# Patient Record
Sex: FEMALE | Race: WHITE | Employment: STUDENT | URBAN - METROPOLITAN AREA
[De-identification: names, ages, dates, MRNs, and addresses within clinical notes are randomized per-mention and may not be internally consistent; named-entity substitution may affect disease eponyms.]

---
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
## 2018-05-31 ENCOUNTER — HOSPITAL ENCOUNTER (EMERGENCY)
Facility: HOSPITAL | Age: 18
Discharge: HOME | End: 2018-05-31
Attending: PEDIATRICS
Payer: COMMERCIAL

## 2018-05-31 ENCOUNTER — APPOINTMENT (OUTPATIENT)
Dept: CT IMAGING | Facility: HOSPITAL | Age: 18
End: 2018-05-31
Attending: PEDIATRICS
Payer: COMMERCIAL

## 2018-05-31 VITALS
TEMPERATURE: 98 F | WEIGHT: 120 LBS | HEART RATE: 77 BPM | RESPIRATION RATE: 16 BRPM | BODY MASS INDEX: 19 KG/M2 | SYSTOLIC BLOOD PRESSURE: 97 MMHG | DIASTOLIC BLOOD PRESSURE: 62 MMHG | OXYGEN SATURATION: 98 %

## 2018-05-31 DIAGNOSIS — G43.109 MIGRAINE WITH AURA AND WITHOUT STATUS MIGRAINOSUS, NOT INTRACTABLE: Primary | ICD-10-CM

## 2018-05-31 PROCEDURE — 96375 TX/PRO/DX INJ NEW DRUG ADDON: CPT

## 2018-05-31 PROCEDURE — 99284 EMERGENCY DEPT VISIT MOD MDM: CPT

## 2018-05-31 PROCEDURE — 70450 CT HEAD/BRAIN W/O DYE: CPT | Performed by: PEDIATRICS

## 2018-05-31 PROCEDURE — 96361 HYDRATE IV INFUSION ADD-ON: CPT

## 2018-05-31 PROCEDURE — 96374 THER/PROPH/DIAG INJ IV PUSH: CPT

## 2018-05-31 PROCEDURE — 81025 URINE PREGNANCY TEST: CPT

## 2018-05-31 RX ORDER — DIPHENHYDRAMINE HYDROCHLORIDE 50 MG/ML
50 INJECTION INTRAMUSCULAR; INTRAVENOUS ONCE
Status: COMPLETED | OUTPATIENT
Start: 2018-05-31 | End: 2018-05-31

## 2018-05-31 RX ORDER — ONDANSETRON 2 MG/ML
4 INJECTION INTRAMUSCULAR; INTRAVENOUS ONCE
Status: COMPLETED | OUTPATIENT
Start: 2018-05-31 | End: 2018-05-31

## 2018-05-31 RX ORDER — ONDANSETRON 4 MG/1
4 TABLET, ORALLY DISINTEGRATING ORAL EVERY 8 HOURS PRN
Qty: 10 TABLET | Refills: 0 | Status: SHIPPED | OUTPATIENT
Start: 2018-05-31 | End: 2018-06-07

## 2018-05-31 RX ORDER — KETOROLAC TROMETHAMINE 30 MG/ML
0.5 INJECTION, SOLUTION INTRAMUSCULAR; INTRAVENOUS ONCE
Status: COMPLETED | OUTPATIENT
Start: 2018-05-31 | End: 2018-05-31

## 2018-05-31 NOTE — ED INITIAL ASSESSMENT (HPI)
Pt with history of migraines. Headache started about 1.5 hours pta. States she had visual aura. c/o numbness and tingling right arm and leg. Deny vomiting. c/o nausea. Deny fever.

## 2018-05-31 NOTE — ED PROVIDER NOTES
Patient Seen in: [facility] Emergency Department    History   Patient presents with:  Headache (neurologic)    Stated Complaint: migraine    HPI    20-year-old female with a history of anxiety, asthma, migraines, ulcerative colitis and primary scler 97/62   Pulse 77   Temp 98.4 °F (36.9 °C) (Temporal)   Resp 16   Wt 54.4 kg   LMP 05/24/2018 (Approximate)   SpO2 98%   BMI 18.79 kg/m²         Physical Exam   PE: Awake, alert, NAD  HEENT: PERRLA; TMS clear; OP clear  COR:  RRR  Chest: clear  Abdomen: sof [address]  ------------------------------------------------------------      MDM   43-year-old female with above past medical history including migraines complaining of migraine headache started this morning.   Patient with a normal neurological exam.  PIV, norm

## 2018-07-06 ENCOUNTER — HOSPITAL ENCOUNTER (OUTPATIENT)
Dept: GENERAL RADIOLOGY | Age: 18
Discharge: HOME | End: 2018-07-06
Attending: INTERNAL MEDICINE
Payer: COMMERCIAL

## 2018-07-06 DIAGNOSIS — R93.3 ABNORMAL VIRTUAL COLONOSCOPE: ICD-10-CM

## 2018-07-06 PROCEDURE — 74018 RADEX ABDOMEN 1 VIEW: CPT | Performed by: INTERNAL MEDICINE

## 2018-11-03 ENCOUNTER — HOSPITAL ENCOUNTER (EMERGENCY)
Facility: HOSPITAL | Age: 18
Discharge: HOME | End: 2018-11-03
Attending: PEDIATRICS
Payer: COMMERCIAL

## 2018-11-03 VITALS
HEART RATE: 88 BPM | SYSTOLIC BLOOD PRESSURE: 111 MMHG | TEMPERATURE: 100 F | RESPIRATION RATE: 18 BRPM | WEIGHT: 108.69 LBS | OXYGEN SATURATION: 100 % | BODY MASS INDEX: 17 KG/M2 | DIASTOLIC BLOOD PRESSURE: 82 MMHG

## 2018-11-03 DIAGNOSIS — G43.119 INTRACTABLE MIGRAINE WITH VISUAL AURA AND WITHOUT STATUS MIGRAINOSUS: Primary | ICD-10-CM

## 2018-11-03 PROCEDURE — 85025 COMPLETE CBC W/AUTO DIFF WBC: CPT | Performed by: PEDIATRICS

## 2018-11-03 PROCEDURE — 80053 COMPREHEN METABOLIC PANEL: CPT | Performed by: PEDIATRICS

## 2018-11-03 PROCEDURE — 99284 EMERGENCY DEPT VISIT MOD MDM: CPT

## 2018-11-03 PROCEDURE — 96375 TX/PRO/DX INJ NEW DRUG ADDON: CPT

## 2018-11-03 PROCEDURE — 96361 HYDRATE IV INFUSION ADD-ON: CPT

## 2018-11-03 PROCEDURE — 96374 THER/PROPH/DIAG INJ IV PUSH: CPT

## 2018-11-03 RX ORDER — DIPHENHYDRAMINE HYDROCHLORIDE 50 MG/ML
50 INJECTION INTRAMUSCULAR; INTRAVENOUS ONCE
Status: COMPLETED | OUTPATIENT
Start: 2018-11-03 | End: 2018-11-03

## 2018-11-03 RX ORDER — ONDANSETRON 4 MG/1
4 TABLET, ORALLY DISINTEGRATING ORAL EVERY 8 HOURS PRN
Qty: 10 TABLET | Refills: 0 | Status: SHIPPED | OUTPATIENT
Start: 2018-11-03 | End: 2018-11-10

## 2018-11-03 RX ORDER — KETOROLAC TROMETHAMINE 30 MG/ML
0.5 INJECTION, SOLUTION INTRAMUSCULAR; INTRAVENOUS ONCE
Status: COMPLETED | OUTPATIENT
Start: 2018-11-03 | End: 2018-11-03

## 2018-11-03 RX ORDER — BUTALBITAL, ASPIRIN, AND CAFFEINE 50; 325; 40 MG/1; MG/1; MG/1
1 CAPSULE ORAL EVERY 4 HOURS PRN
COMMUNITY

## 2018-11-03 RX ORDER — ONDANSETRON 2 MG/ML
2 INJECTION INTRAMUSCULAR; INTRAVENOUS ONCE
Status: DISCONTINUED | OUTPATIENT
Start: 2018-11-03 | End: 2018-11-03

## 2018-11-03 RX ORDER — ONDANSETRON 2 MG/ML
4 INJECTION INTRAMUSCULAR; INTRAVENOUS ONCE
Status: COMPLETED | OUTPATIENT
Start: 2018-11-03 | End: 2018-11-03

## 2018-11-03 RX ORDER — ONDANSETRON 4 MG/1
4 TABLET, ORALLY DISINTEGRATING ORAL EVERY 8 HOURS PRN
COMMUNITY

## 2018-11-03 NOTE — ED INITIAL ASSESSMENT (HPI)
Reports got the flu shot yesterday, and woke up with body aches. Reports body aches were so bad, she took x1 hydrocodone one hour ago because she cannot take nsaids. Reports approx 1 hr ago began feeling blurry vision.  Reports last migraine last week, and

## 2018-11-03 NOTE — ED NOTES
Pt declined additional zofran, pt reports nausea improved. Pt stable, IV d/c with cath intact. Pt reports feeling better.

## 2018-11-03 NOTE — ED PROVIDER NOTES
Patient Seen in: [facility] Emergency Department    History   Patient presents with: Eye Visual Problem (opthalmic)    Stated Complaint: Ocular migraines, more frequent, \"hard time seeing\" per patient.  Does not follo*    HPI    75-year-old female All other systems reviewed and negative except as noted above.     Physical Exam     ED Triage Vitals [11/03/18 1321]   /40   Pulse 118   Resp 24   Temp 100.1 °F (37.8 °C)   Temp src Temporal   SpO2 100 %   O2 Device None (Room air)       Current: ondansetron HCl (ZOFRAN) injection 4 mg (4 mg Intravenous Given 11/3/18 4390)   DiphenhydrAMINE HCl (BENADRYL) injection 50 mg (50 mg Intravenous Given 11/3/18 0445)           MDM   23year-old female with a history of migraines with visual aura teacher

## 2019-02-22 ENCOUNTER — APPOINTMENT (OUTPATIENT)
Dept: CT IMAGING | Age: 19
End: 2019-02-22
Attending: EMERGENCY MEDICINE
Payer: COMMERCIAL

## 2019-02-22 ENCOUNTER — HOSPITAL ENCOUNTER (EMERGENCY)
Age: 19
Discharge: HOME | End: 2019-02-22
Attending: EMERGENCY MEDICINE
Payer: COMMERCIAL

## 2019-02-22 VITALS
OXYGEN SATURATION: 100 % | HEIGHT: 67 IN | DIASTOLIC BLOOD PRESSURE: 74 MMHG | SYSTOLIC BLOOD PRESSURE: 125 MMHG | TEMPERATURE: 99 F | BODY MASS INDEX: 18.05 KG/M2 | WEIGHT: 115 LBS | RESPIRATION RATE: 16 BRPM | HEART RATE: 83 BPM

## 2019-02-22 DIAGNOSIS — S00.03XA CONTUSION OF SCALP, INITIAL ENCOUNTER: Primary | ICD-10-CM

## 2019-02-22 PROCEDURE — 99284 EMERGENCY DEPT VISIT MOD MDM: CPT | Performed by: EMERGENCY MEDICINE

## 2019-02-22 PROCEDURE — 70450 CT HEAD/BRAIN W/O DYE: CPT | Performed by: EMERGENCY MEDICINE

## 2019-02-22 PROCEDURE — 72125 CT NECK SPINE W/O DYE: CPT | Performed by: EMERGENCY MEDICINE

## 2019-02-22 NOTE — ED INITIAL ASSESSMENT (HPI)
Pt c/o head and neck pain after slipping out of her rocking chair yesterday at her dorm room. Pt denies loc. Pt c/o slight nausea and bilateral eye pain.

## 2019-02-22 NOTE — ED PROVIDER NOTES
Patient Seen in: [facility] In [place]    History   Patient presents with:  Head Neck Injury (neurologic, musculoskeletal)    Stated Complaint: head injury, headache    HPI    The patient is a 42-year-old female with a history of migr 16   Ht 170.2 cm (5' 7\")   Wt 52.2 kg   LMP 02/19/2019   SpO2 100%   BMI 18.01 kg/m²         Physical Exam    General: Alert and oriented in no distress. Eyes: EOMI, PERRLA. Neuro: CN II-XII intact.  Grossly normal and symmetric motor strength and sensa base through C7. Multiplanar reconstructions are     generated. Dose reduction techniques were used.  Dose information is     transmitted to the  [facility]     Radiology) Silvestre Brunson [address] ([address]) which includes the Dose     In (25933), 5/31/2018, 13:13. INDICATIONS:  head injury, headache         TECHNIQUE:  Noncontrast CT scanning is performed through the brain. Dose     reduction techniques were used.  Dose information is transmitted to the [facility] of Medication List as of 2/22/2019  7:13 PM

## 2019-03-01 VITALS
WEIGHT: 130 LBS | SYSTOLIC BLOOD PRESSURE: 118 MMHG | BODY MASS INDEX: 20.4 KG/M2 | HEART RATE: 106 BPM | HEIGHT: 67 IN | RESPIRATION RATE: 22 BRPM | DIASTOLIC BLOOD PRESSURE: 80 MMHG

## 2019-03-17 ENCOUNTER — APPOINTMENT (OUTPATIENT)
Dept: GENERAL RADIOLOGY | Facility: HOSPITAL | Age: 19
End: 2019-03-17
Attending: EMERGENCY MEDICINE
Payer: COMMERCIAL

## 2019-03-17 ENCOUNTER — HOSPITAL ENCOUNTER (EMERGENCY)
Facility: HOSPITAL | Age: 19
Discharge: HOME | End: 2019-03-17
Attending: EMERGENCY MEDICINE
Payer: COMMERCIAL

## 2019-03-17 VITALS
HEIGHT: 67 IN | OXYGEN SATURATION: 98 % | BODY MASS INDEX: 18.05 KG/M2 | DIASTOLIC BLOOD PRESSURE: 70 MMHG | SYSTOLIC BLOOD PRESSURE: 112 MMHG | TEMPERATURE: 98 F | HEART RATE: 68 BPM | RESPIRATION RATE: 17 BRPM | WEIGHT: 115 LBS

## 2019-03-17 DIAGNOSIS — R11.0 NAUSEA: Primary | ICD-10-CM

## 2019-03-17 DIAGNOSIS — R10.9 ABDOMINAL PAIN OF UNKNOWN ETIOLOGY: ICD-10-CM

## 2019-03-17 LAB
ALBUMIN SERPL-MCNC: 3.8 G/DL (ref 3.4–5)
ALBUMIN/GLOB SERPL: 1 {RATIO} (ref 1–2)
ALP LIVER SERPL-CCNC: 283 U/L (ref 52–144)
ALT SERPL-CCNC: 159 U/L (ref 13–56)
ANION GAP SERPL CALC-SCNC: 11 MMOL/L (ref 0–18)
AST SERPL-CCNC: 67 U/L (ref 15–37)
BASOPHILS # BLD AUTO: 0.02 X10(3) UL (ref 0–0.2)
BASOPHILS NFR BLD AUTO: 0.3 %
BILIRUB SERPL-MCNC: 0.7 MG/DL (ref 0.1–2)
BILIRUB UR QL STRIP.AUTO: NEGATIVE
BUN BLD-MCNC: 8 MG/DL (ref 7–18)
BUN/CREAT SERPL: 10.8 (ref 10–20)
CALCIUM BLD-MCNC: 9 MG/DL (ref 8.5–10.1)
CHLORIDE SERPL-SCNC: 109 MMOL/L (ref 98–107)
CO2 SERPL-SCNC: 21 MMOL/L (ref 21–32)
COLOR UR AUTO: YELLOW
CREAT BLD-MCNC: 0.74 MG/DL (ref 0.55–1.02)
DEPRECATED RDW RBC AUTO: 38.3 FL (ref 35.1–46.3)
EOSINOPHIL # BLD AUTO: 0.15 X10(3) UL (ref 0–0.7)
EOSINOPHIL NFR BLD AUTO: 2 %
ERYTHROCYTE [DISTWIDTH] IN BLOOD BY AUTOMATED COUNT: 12 % (ref 11–15)
GLOBULIN PLAS-MCNC: 3.7 G/DL (ref 2.8–4.4)
GLUCOSE BLD-MCNC: 80 MG/DL (ref 70–99)
GLUCOSE UR STRIP.AUTO-MCNC: NEGATIVE MG/DL
HCT VFR BLD AUTO: 40.6 % (ref 35–48)
HGB BLD-MCNC: 14.4 G/DL (ref 12–16)
IMM GRANULOCYTES # BLD AUTO: 0.02 X10(3) UL (ref 0–1)
IMM GRANULOCYTES NFR BLD: 0.3 %
KETONES UR STRIP.AUTO-MCNC: 20 MG/DL
LEUKOCYTE ESTERASE UR QL STRIP.AUTO: NEGATIVE
LYMPHOCYTES # BLD AUTO: 2.16 X10(3) UL (ref 1–4)
LYMPHOCYTES NFR BLD AUTO: 28.5 %
M PROTEIN MFR SERPL ELPH: 7.5 G/DL (ref 6.4–8.2)
MCH RBC QN AUTO: 31.2 PG (ref 26–34)
MCHC RBC AUTO-ENTMCNC: 35.5 G/DL (ref 31–37)
MCV RBC AUTO: 88.1 FL (ref 80–100)
MONOCYTES # BLD AUTO: 0.56 X10(3) UL (ref 0.1–1)
MONOCYTES NFR BLD AUTO: 7.4 %
NEUTROPHILS # BLD AUTO: 4.68 X10 (3) UL (ref 1.5–7.7)
NEUTROPHILS # BLD AUTO: 4.68 X10(3) UL (ref 1.5–7.7)
NEUTROPHILS NFR BLD AUTO: 61.5 %
NITRITE UR QL STRIP.AUTO: NEGATIVE
OSMOLALITY SERPL CALC.SUM OF ELEC: 289 MOSM/KG (ref 275–295)
PH UR STRIP.AUTO: 6 [PH] (ref 4.5–8)
PLATELET # BLD AUTO: 258 10(3)UL (ref 150–450)
POCT LOT NUMBER: NORMAL
POCT URINE PREGNANCY: NEGATIVE
POTASSIUM SERPL-SCNC: 3 MMOL/L (ref 3.5–5.1)
PROCEDURE CONTROL: NORMAL
PROT UR STRIP.AUTO-MCNC: NEGATIVE MG/DL
RBC # BLD AUTO: 4.61 X10(6)UL (ref 3.8–5.3)
RBC UR QL AUTO: NEGATIVE
SODIUM SERPL-SCNC: 141 MMOL/L (ref 136–145)
SP GR UR STRIP.AUTO: 1.02 (ref 1–1.03)
UROBILINOGEN UR STRIP.AUTO-MCNC: <2 MG/DL
WBC # BLD AUTO: 7.6 X10(3) UL (ref 4–11)

## 2019-03-17 PROCEDURE — 99284 EMERGENCY DEPT VISIT MOD MDM: CPT

## 2019-03-17 PROCEDURE — 81025 URINE PREGNANCY TEST: CPT

## 2019-03-17 PROCEDURE — 81003 URINALYSIS AUTO W/O SCOPE: CPT | Performed by: EMERGENCY MEDICINE

## 2019-03-17 PROCEDURE — 74019 RADEX ABDOMEN 2 VIEWS: CPT | Performed by: EMERGENCY MEDICINE

## 2019-03-17 PROCEDURE — 96360 HYDRATION IV INFUSION INIT: CPT

## 2019-03-17 PROCEDURE — 96361 HYDRATE IV INFUSION ADD-ON: CPT

## 2019-03-17 PROCEDURE — 99285 EMERGENCY DEPT VISIT HI MDM: CPT

## 2019-03-17 PROCEDURE — 80053 COMPREHEN METABOLIC PANEL: CPT | Performed by: EMERGENCY MEDICINE

## 2019-03-17 PROCEDURE — 85025 COMPLETE CBC W/AUTO DIFF WBC: CPT | Performed by: EMERGENCY MEDICINE

## 2019-03-17 RX ORDER — ONDANSETRON 4 MG/1
4 TABLET, ORALLY DISINTEGRATING ORAL EVERY 4 HOURS PRN
Qty: 10 TABLET | Refills: 0 | Status: SHIPPED | OUTPATIENT
Start: 2019-03-17 | End: 2019-03-24

## 2019-03-17 RX ORDER — POTASSIUM CHLORIDE 20 MEQ/1
40 TABLET, EXTENDED RELEASE ORAL ONCE
Status: COMPLETED | OUTPATIENT
Start: 2019-03-17 | End: 2019-03-17

## 2019-03-17 RX ORDER — SODIUM CHLORIDE 9 MG/ML
1000 INJECTION, SOLUTION INTRAVENOUS ONCE
Status: COMPLETED | OUTPATIENT
Start: 2019-03-17 | End: 2019-03-17

## 2019-03-17 NOTE — ED PROVIDER NOTES
Patient Seen in: [facility] Emergency Department    History   Patient presents with:  Abdominal Pain    Stated Complaint: abd pain    HPI    68-year-old female presents with nausea, poor appetite, shakiness.   She reports the symptoms began after a c Pulse 85   Resp 19   Temp 97.8 °F (36.6 °C)   Temp src Tympanic   SpO2 99 %   O2 Device None (Room air)       Current:/56   Pulse 72   Temp 97.8 °F (36.6 °C) (Tympanic)   Resp 18   Ht 170.2 cm (5' 7\")   Wt 52.2 kg   LMP 02/19/2019   SpO2 98%   BMI 2/22/2019  PROCEDURE:  CT BRAIN OR HEAD (27844)  COMPARISON:  [facility] , CT BRAIN OR HEAD (22671), 5/31/2018, 13:13. INDICATIONS:  head injury, headache  TECHNIQUE:  Noncontrast CT scanning is performed through the brain.  Dose reduction techniques were used Technologist)  Marcus Agustin off chair yesterday. Has headache and neck pain with nausea and slight bilateral eye pain. No loss of consciousness. FINDINGS:  CRANIOCERVICAL AREA:  Normal foramen magnum with no Chiari malformation.  PARASPINAL AREA:  Normal with no ulcerative colitis. CONCLUSION:    Normal bowel gas pattern. No excessive gas or stool. No organomegaly, suspicious calcification, lower lobe pneumonia or bony abnormality. Tiny benign-appearing phlebolith calcifications in the pelvis.    Dictated Potential duplicate medications found. Please discuss with provider.

## 2019-07-18 ENCOUNTER — OFFICE VISIT (OUTPATIENT)
Dept: NEUROLOGY | Facility: CLINIC | Age: 19
End: 2019-07-18
Payer: COMMERCIAL

## 2019-07-18 VITALS
RESPIRATION RATE: 16 BRPM | BODY MASS INDEX: 18 KG/M2 | DIASTOLIC BLOOD PRESSURE: 72 MMHG | HEART RATE: 76 BPM | SYSTOLIC BLOOD PRESSURE: 120 MMHG | WEIGHT: 115 LBS

## 2019-07-18 DIAGNOSIS — G43.109 MIGRAINE WITH AURA AND WITHOUT STATUS MIGRAINOSUS, NOT INTRACTABLE: Primary | ICD-10-CM

## 2019-07-18 DIAGNOSIS — F41.9 ANXIETY: ICD-10-CM

## 2019-07-18 DIAGNOSIS — R46.89 EPISODE OF ABNORMAL BEHAVIOR: ICD-10-CM

## 2019-07-18 PROCEDURE — 99244 OFF/OP CNSLTJ NEW/EST MOD 40: CPT | Performed by: OTHER

## 2019-07-18 RX ORDER — VANCOMYCIN HYDROCHLORIDE 125 MG/1
125 CAPSULE ORAL 3 TIMES DAILY
COMMUNITY
Start: 2019-06-25

## 2019-07-18 RX ORDER — CLONAZEPAM 0.5 MG/1
1 TABLET ORAL AS NEEDED
Refills: 0 | COMMUNITY
Start: 2019-05-30

## 2019-07-18 RX ORDER — ERGOCALCIFEROL 1.25 MG/1
50000 CAPSULE ORAL WEEKLY
COMMUNITY
Start: 2019-06-21

## 2019-07-18 NOTE — PROGRESS NOTES
[facility]   Neurology- INITIAL CLINIC VISIT  2019, 3:02 PM     Shannon Julian Patient Status:  No patient class for patient encounter    2000 MRN [id number]   Location [facility] that she has never smoked. She has never used smokeless tobacco. She reports that she does not drink alcohol or use drugs.     Allergies:    Nuts                    ANAPHYLAXIS    Comment:All tree nuts  Peanuts                 ANAPHYLAXIS    MEDICATIONS: lesions. Musculoskeletal: There is no scoliosis, or joint deformities  Neurologic examination:  Mental status: Patient is alert, attentive, and oriented x 3. Language is coherent and fluent without aphasia.  Memory, comprehension and ability to follow comm to anxiety, occur when overthinking, then feels lightheaded, and has head \"tickling\" feeling, and feels shaky, feels like in a fog, and as though she is hyperventilating, does not have loss of awareness, last 20 minutes, not consistent with seizure, but

## 2019-07-18 NOTE — PATIENT INSTRUCTIONS
Refill policies:    • Allow 2-3 business days for refills; controlled substances may take longer.   • Contact your pharmacy at least 5 days prior to running out of medication and have them send an electronic request or submit request through the “request re Depending on your insurance carrier, approval may take 3-10 days. It is highly recommended patients contact their insurance carrier directly to determine coverage.   If test is done without insurance authorization, patient may be responsible for the entire chest for an EKG tracing. · Head will be measured using a washable grease pencil. · Head will be prepped with a mild abrasive for good conductivity. · Electrodes are applied with paste and gauze.   Paste is water soluble so most of it will be cleaned out

## 2019-07-18 NOTE — PROGRESS NOTES
The patient is here for migraines. The patient is also having trouble staying asleep. The patient has occular migraines on and of. The patient states when she has her occular migraines her speech decreases.  The patient denies speech, memory or balance ashley

## 2019-09-13 ENCOUNTER — HOSPITAL ENCOUNTER (EMERGENCY)
Age: 19
Discharge: HOME | End: 2019-09-13
Attending: EMERGENCY MEDICINE
Payer: COMMERCIAL

## 2019-09-13 ENCOUNTER — APPOINTMENT (OUTPATIENT)
Dept: CT IMAGING | Age: 19
End: 2019-09-13
Attending: EMERGENCY MEDICINE
Payer: COMMERCIAL

## 2019-09-13 VITALS
RESPIRATION RATE: 16 BRPM | WEIGHT: 122 LBS | TEMPERATURE: 98 F | HEIGHT: 67 IN | BODY MASS INDEX: 19.15 KG/M2 | DIASTOLIC BLOOD PRESSURE: 57 MMHG | OXYGEN SATURATION: 99 % | HEART RATE: 70 BPM | SYSTOLIC BLOOD PRESSURE: 106 MMHG

## 2019-09-13 DIAGNOSIS — S06.0X0A CONCUSSION WITHOUT LOSS OF CONSCIOUSNESS, INITIAL ENCOUNTER: ICD-10-CM

## 2019-09-13 DIAGNOSIS — S09.90XA MINOR HEAD INJURY, INITIAL ENCOUNTER: Primary | ICD-10-CM

## 2019-09-13 PROCEDURE — 99284 EMERGENCY DEPT VISIT MOD MDM: CPT

## 2019-09-13 PROCEDURE — 70450 CT HEAD/BRAIN W/O DYE: CPT | Performed by: EMERGENCY MEDICINE

## 2019-09-13 RX ORDER — ONDANSETRON 4 MG/1
4 TABLET, ORALLY DISINTEGRATING ORAL EVERY 6 HOURS PRN
Qty: 10 TABLET | Refills: 0 | Status: SHIPPED | OUTPATIENT
Start: 2019-09-13 | End: 2019-09-20

## 2019-09-14 NOTE — ED INITIAL ASSESSMENT (HPI)
Pt c/o headache and nausea for \"a week and half\" after hitting head on floor while dancing. Pt denies LOC at time of incident.

## 2019-09-14 NOTE — ED PROVIDER NOTES
Patient Seen in: [facility] In [place]    History   Patient presents with:  Head Neck Injury (neurologic, musculoskeletal)    Stated Complaint: hit head 2 weeks ago    HPI    Patient is a 61-year-old female who presents with mid heada She appears well-developed and well-nourished. HENT:   Head: Normocephalic and atraumatic. Mouth/Throat: Oropharynx is clear and moist.   Eyes: Pupils are equal, round, and reactive to light.  Conjunctivae and EOM are normal.   Neck: Normal range of mot 21:10     Approved by: Brooks Brooke MD              MDM   23-year-old female who reports she injured had about a week and a half ago, struck it against the floor.   No loss of consciousness but since then she has had intermittent headaches, nausea, so

## 2019-11-05 PROCEDURE — 99285 EMERGENCY DEPT VISIT HI MDM: CPT | Performed by: NURSE PRACTITIONER

## 2020-02-20 ENCOUNTER — APPOINTMENT (OUTPATIENT)
Dept: GENERAL RADIOLOGY | Facility: HOSPITAL | Age: 20
End: 2020-02-20
Attending: EMERGENCY MEDICINE
Payer: COMMERCIAL

## 2020-02-20 ENCOUNTER — HOSPITAL ENCOUNTER (EMERGENCY)
Facility: HOSPITAL | Age: 20
Discharge: HOME | End: 2020-02-20
Attending: EMERGENCY MEDICINE
Payer: COMMERCIAL

## 2020-02-20 VITALS
RESPIRATION RATE: 20 BRPM | BODY MASS INDEX: 18 KG/M2 | OXYGEN SATURATION: 98 % | WEIGHT: 115 LBS | TEMPERATURE: 101 F | HEART RATE: 83 BPM | DIASTOLIC BLOOD PRESSURE: 69 MMHG | SYSTOLIC BLOOD PRESSURE: 106 MMHG

## 2020-02-20 DIAGNOSIS — E86.0 DEHYDRATION: ICD-10-CM

## 2020-02-20 DIAGNOSIS — R11.0 NAUSEA: Primary | ICD-10-CM

## 2020-02-20 DIAGNOSIS — J06.9 UPPER RESPIRATORY TRACT INFECTION, UNSPECIFIED TYPE: ICD-10-CM

## 2020-02-20 DIAGNOSIS — R10.84 ABDOMINAL PAIN, GENERALIZED: ICD-10-CM

## 2020-02-20 LAB
ADENOVIRUS PCR:: NEGATIVE
ALBUMIN SERPL-MCNC: 2.9 G/DL (ref 3.4–5)
ALBUMIN/GLOB SERPL: 0.6 {RATIO} (ref 1–2)
ALP LIVER SERPL-CCNC: 332 U/L (ref 52–144)
ALT SERPL-CCNC: 102 U/L (ref 13–56)
ANION GAP SERPL CALC-SCNC: 10 MMOL/L (ref 0–18)
AST SERPL-CCNC: 109 U/L (ref 15–37)
B PERT DNA SPEC QL NAA+PROBE: NEGATIVE
BASOPHILS # BLD AUTO: 0.09 X10(3) UL (ref 0–0.2)
BASOPHILS NFR BLD AUTO: 1.1 %
BILIRUB SERPL-MCNC: 0.7 MG/DL (ref 0.1–2)
BILIRUB UR QL STRIP.AUTO: NEGATIVE
BUN BLD-MCNC: 10 MG/DL (ref 7–18)
BUN/CREAT SERPL: 10.6 (ref 10–20)
C PNEUM DNA SPEC QL NAA+PROBE: NEGATIVE
CALCIUM BLD-MCNC: 8.2 MG/DL (ref 8.5–10.1)
CHLORIDE SERPL-SCNC: 100 MMOL/L (ref 98–112)
CLARITY UR REFRACT.AUTO: CLEAR
CO2 SERPL-SCNC: 21 MMOL/L (ref 21–32)
COLOR UR AUTO: YELLOW
CORONAVIRUS 229E PCR:: NEGATIVE
CORONAVIRUS HKU1 PCR:: NEGATIVE
CORONAVIRUS NL63 PCR:: NEGATIVE
CORONAVIRUS OC43 PCR:: NEGATIVE
CREAT BLD-MCNC: 0.94 MG/DL (ref 0.55–1.02)
DEPRECATED RDW RBC AUTO: 38.7 FL (ref 35.1–46.3)
EOSINOPHIL # BLD AUTO: 0.03 X10(3) UL (ref 0–0.7)
EOSINOPHIL NFR BLD AUTO: 0.4 %
ERYTHROCYTE [DISTWIDTH] IN BLOOD BY AUTOMATED COUNT: 12 % (ref 11–15)
FLUAV RNA SPEC QL NAA+PROBE: NEGATIVE
FLUBV RNA SPEC QL NAA+PROBE: NEGATIVE
GLOBULIN PLAS-MCNC: 4.7 G/DL (ref 2.8–4.4)
GLUCOSE BLD-MCNC: 107 MG/DL (ref 70–99)
GLUCOSE UR STRIP.AUTO-MCNC: NEGATIVE MG/DL
HCT VFR BLD AUTO: 40.4 % (ref 35–48)
HGB BLD-MCNC: 14 G/DL (ref 12–16)
IMM GRANULOCYTES # BLD AUTO: 0.06 X10(3) UL (ref 0–1)
IMM GRANULOCYTES NFR BLD: 0.7 %
KETONES UR STRIP.AUTO-MCNC: 20 MG/DL
LEUKOCYTE ESTERASE UR QL STRIP.AUTO: NEGATIVE
LIPASE SERPL-CCNC: 130 U/L (ref 73–393)
LYMPHOCYTES # BLD AUTO: 3.74 X10(3) UL (ref 1–4)
LYMPHOCYTES NFR BLD AUTO: 45.3 %
M PROTEIN MFR SERPL ELPH: 7.6 G/DL (ref 6.4–8.2)
MCH RBC QN AUTO: 30.4 PG (ref 26–34)
MCHC RBC AUTO-ENTMCNC: 34.7 G/DL (ref 31–37)
MCV RBC AUTO: 87.6 FL (ref 80–100)
METAPNEUMOVIRUS PCR:: NEGATIVE
MONOCYTES # BLD AUTO: 0.49 X10(3) UL (ref 0.1–1)
MONOCYTES NFR BLD AUTO: 5.9 %
MONOSCREEN: NEGATIVE
MORPHOLOGY: NORMAL
MYCOPLASMA PNEUMONIA PCR:: NEGATIVE
NEUTROPHILS # BLD AUTO: 3.84 X10 (3) UL (ref 1.5–7.7)
NEUTROPHILS # BLD AUTO: 3.84 X10(3) UL (ref 1.5–7.7)
NEUTROPHILS NFR BLD AUTO: 46.6 %
NITRITE UR QL STRIP.AUTO: NEGATIVE
OSMOLALITY SERPL CALC.SUM OF ELEC: 272 MOSM/KG (ref 275–295)
PARAINFLUENZA 1 PCR:: NEGATIVE
PARAINFLUENZA 2 PCR:: NEGATIVE
PARAINFLUENZA 3 PCR:: NEGATIVE
PARAINFLUENZA 4 PCR:: NEGATIVE
PH UR STRIP.AUTO: 6 [PH] (ref 4.5–8)
PLATELET # BLD AUTO: 191 10(3)UL (ref 150–450)
PLATELET MORPHOLOGY: NORMAL
POCT LOT NUMBER: NORMAL
POCT URINE PREGNANCY: NEGATIVE
POTASSIUM SERPL-SCNC: 3 MMOL/L (ref 3.5–5.1)
PROCEDURE CONTROL: NORMAL
PROT UR STRIP.AUTO-MCNC: NEGATIVE MG/DL
RBC # BLD AUTO: 4.61 X10(6)UL (ref 3.8–5.3)
RHINOVIRUS/ENTERO PCR:: NEGATIVE
RSV RNA SPEC QL NAA+PROBE: NEGATIVE
SODIUM SERPL-SCNC: 131 MMOL/L (ref 136–145)
SP GR UR STRIP.AUTO: 1.01 (ref 1–1.03)
UROBILINOGEN UR STRIP.AUTO-MCNC: <2 MG/DL
WBC # BLD AUTO: 8.3 X10(3) UL (ref 4–11)

## 2020-02-20 PROCEDURE — 81001 URINALYSIS AUTO W/SCOPE: CPT | Performed by: EMERGENCY MEDICINE

## 2020-02-20 PROCEDURE — 99285 EMERGENCY DEPT VISIT HI MDM: CPT

## 2020-02-20 PROCEDURE — 96376 TX/PRO/DX INJ SAME DRUG ADON: CPT | Performed by: EMERGENCY MEDICINE

## 2020-02-20 PROCEDURE — 99284 EMERGENCY DEPT VISIT MOD MDM: CPT

## 2020-02-20 PROCEDURE — 85025 COMPLETE CBC W/AUTO DIFF WBC: CPT | Performed by: EMERGENCY MEDICINE

## 2020-02-20 PROCEDURE — 87486 CHLMYD PNEUM DNA AMP PROBE: CPT | Performed by: EMERGENCY MEDICINE

## 2020-02-20 PROCEDURE — 80053 COMPREHEN METABOLIC PANEL: CPT | Performed by: EMERGENCY MEDICINE

## 2020-02-20 PROCEDURE — 99284 EMERGENCY DEPT VISIT MOD MDM: CPT | Performed by: EMERGENCY MEDICINE

## 2020-02-20 PROCEDURE — 86403 PARTICLE AGGLUT ANTBDY SCRN: CPT | Performed by: EMERGENCY MEDICINE

## 2020-02-20 PROCEDURE — 87581 M.PNEUMON DNA AMP PROBE: CPT | Performed by: EMERGENCY MEDICINE

## 2020-02-20 PROCEDURE — 87798 DETECT AGENT NOS DNA AMP: CPT | Performed by: EMERGENCY MEDICINE

## 2020-02-20 PROCEDURE — 87999 UNLISTED MICROBIOLOGY PX: CPT

## 2020-02-20 PROCEDURE — 81025 URINE PREGNANCY TEST: CPT | Performed by: EMERGENCY MEDICINE

## 2020-02-20 PROCEDURE — 96374 THER/PROPH/DIAG INJ IV PUSH: CPT | Performed by: EMERGENCY MEDICINE

## 2020-02-20 PROCEDURE — 96361 HYDRATE IV INFUSION ADD-ON: CPT | Performed by: EMERGENCY MEDICINE

## 2020-02-20 PROCEDURE — 71046 X-RAY EXAM CHEST 2 VIEWS: CPT | Performed by: EMERGENCY MEDICINE

## 2020-02-20 PROCEDURE — 87633 RESP VIRUS 12-25 TARGETS: CPT | Performed by: EMERGENCY MEDICINE

## 2020-02-20 PROCEDURE — 83690 ASSAY OF LIPASE: CPT | Performed by: EMERGENCY MEDICINE

## 2020-02-20 RX ORDER — POTASSIUM CHLORIDE 20 MEQ/1
20 TABLET, EXTENDED RELEASE ORAL 2 TIMES DAILY
Qty: 14 TABLET | Refills: 0 | Status: SHIPPED | OUTPATIENT
Start: 2020-02-20

## 2020-02-20 RX ORDER — ONDANSETRON 2 MG/ML
INJECTION INTRAMUSCULAR; INTRAVENOUS
Status: DISCONTINUED
Start: 2020-02-20 | End: 2020-02-20

## 2020-02-20 RX ORDER — ONDANSETRON 2 MG/ML
4 INJECTION INTRAMUSCULAR; INTRAVENOUS ONCE
Status: COMPLETED | OUTPATIENT
Start: 2020-02-20 | End: 2020-02-20

## 2020-02-20 RX ORDER — ONDANSETRON 4 MG/1
4 TABLET, ORALLY DISINTEGRATING ORAL EVERY 4 HOURS PRN
Qty: 10 TABLET | Refills: 0 | Status: SHIPPED | OUTPATIENT
Start: 2020-02-20 | End: 2020-02-27

## 2020-02-20 NOTE — ED PROVIDER NOTES
Patient Seen in: [facility] Emergency Department      History   Patient presents with:  Nausea/Vomiting/Diarrhea    Stated Complaint: NVD    HPI    59-year-old female past medical history of ulcerative colitis currently on vancomycin presents ED wit Current:/69   Pulse 83   Temp (!) 101 °F (38.3 °C) (Tympanic)   Resp 20   Wt 52.2 kg   LMP  (LMP Unknown)   SpO2 98%   BMI 18.01 kg/m²         Physical Exam  Vitals signs and nursing note reviewed.    Constitutional:       Appearance: She is wel MONONUCLEOSIS, QUAL - Normal   RESPIRATORY PANEL FLU EXPANDED - Normal    Narrative:     SARS, MERS and 2019 nCoV coronaviruses are not tested on this assay, and cross-reactivity with other coronaviruses on this panel is not predicted.    CBC WITH DIFFERE type    Disposition:  Discharge  2/20/2020  4:03 am    Follow-up:  Dex Venegas MD  [address] [place] [id number]  [phone]    Go in 2 days          Medications Prescribed:  Discharge Medication List as of 2/20/2020  5:10 AM    START ta

## 2020-12-11 PROCEDURE — 88175 CYTOPATH C/V AUTO FLUID REDO: CPT | Performed by: CLINICAL MEDICAL LABORATORY

## 2020-12-11 PROCEDURE — 87491 CHLMYD TRACH DNA AMP PROBE: CPT | Performed by: CLINICAL MEDICAL LABORATORY

## 2020-12-11 PROCEDURE — 87591 N.GONORRHOEAE DNA AMP PROB: CPT | Performed by: CLINICAL MEDICAL LABORATORY

## 2020-12-14 ENCOUNTER — LAB REQUISITION (OUTPATIENT)
Dept: LAB | Age: 20
End: 2020-12-14

## 2020-12-14 DIAGNOSIS — R82.90 UNSPECIFIED ABNORMAL FINDINGS IN URINE: ICD-10-CM

## 2020-12-14 DIAGNOSIS — Z12.4 ENCOUNTER FOR SCREENING FOR MALIGNANT NEOPLASM OF CERVIX: ICD-10-CM

## 2020-12-14 DIAGNOSIS — Z11.3 ENCOUNTER FOR SCREENING FOR INFECTIONS WITH A PREDOMINANTLY SEXUAL MODE OF TRANSMISSION: ICD-10-CM

## 2020-12-14 LAB
C TRACH RRNA CVX QL NAA+PROBE: NEGATIVE
Lab: NORMAL
N GONORRHOEA RRNA CVX QL NAA+PROBE: NEGATIVE

## 2020-12-19 LAB
CASE RPRT: ABNORMAL
CYTOLOGY CVX/VAG STUDY: ABNORMAL
CYTOLOGY CVX/VAG STUDY: ABNORMAL
GEN CATEG CVX/VAG CYTO-IMP: ABNORMAL
PAP EDUCATIONAL NOTE: ABNORMAL
SPECIMEN ADEQUACY: ABNORMAL

## 2021-01-06 LAB — HOLD SPECIMEN: NORMAL

## 2021-01-13 PROCEDURE — 88305 TISSUE EXAM BY PATHOLOGIST: CPT | Performed by: CLINICAL MEDICAL LABORATORY

## 2021-01-14 ENCOUNTER — LAB REQUISITION (OUTPATIENT)
Dept: LAB | Age: 21
End: 2021-01-14

## 2021-01-14 DIAGNOSIS — R87.611 ATYPICAL SQUAMOUS CELLS CANNOT EXCLUDE HIGH GRADE SQUAMOUS INTRAEPITHELIAL LESION ON CYTOLOGIC SMEAR OF CERVIX (ASC-H): ICD-10-CM

## 2021-01-15 LAB
ASR DISCLAIMER: NORMAL
CASE RPRT: NORMAL
CLINICAL INFO: NORMAL
PATH REPORT.FINAL DX SPEC: NORMAL
PATH REPORT.GROSS SPEC: NORMAL

## 2021-02-20 ENCOUNTER — LABORATORY ENCOUNTER (OUTPATIENT)
Dept: LAB | Age: 21
End: 2021-02-20
Attending: PEDIATRICS
Payer: COMMERCIAL

## 2021-02-20 DIAGNOSIS — J03.90 TONSILLITIS: Primary | ICD-10-CM

## 2021-02-20 LAB
BASOPHILS # BLD AUTO: 0.04 X10(3) UL (ref 0–0.2)
BASOPHILS NFR BLD AUTO: 0.3 %
DEPRECATED RDW RBC AUTO: 40.5 FL (ref 35.1–46.3)
EOSINOPHIL # BLD AUTO: 0.07 X10(3) UL (ref 0–0.7)
EOSINOPHIL NFR BLD AUTO: 0.5 %
ERYTHROCYTE [DISTWIDTH] IN BLOOD BY AUTOMATED COUNT: 11.9 % (ref 11–15)
HCT VFR BLD AUTO: 42 %
HGB BLD-MCNC: 14.1 G/DL
IMM GRANULOCYTES # BLD AUTO: 0.05 X10(3) UL (ref 0–1)
IMM GRANULOCYTES NFR BLD: 0.3 %
LYMPHOCYTES # BLD AUTO: 1.71 X10(3) UL (ref 1–4)
LYMPHOCYTES NFR BLD AUTO: 11.1 %
MCH RBC QN AUTO: 31.1 PG (ref 26–34)
MCHC RBC AUTO-ENTMCNC: 33.6 G/DL (ref 31–37)
MCV RBC AUTO: 92.7 FL
MONOCYTES # BLD AUTO: 1.09 X10(3) UL (ref 0.1–1)
MONOCYTES NFR BLD AUTO: 7.1 %
NEUTROPHILS # BLD AUTO: 12.45 X10 (3) UL (ref 1.5–7.7)
NEUTROPHILS # BLD AUTO: 12.45 X10(3) UL (ref 1.5–7.7)
NEUTROPHILS NFR BLD AUTO: 80.7 %
PLATELET # BLD AUTO: 213 10(3)UL (ref 150–450)
RBC # BLD AUTO: 4.53 X10(6)UL
WBC # BLD AUTO: 15.4 X10(3) UL (ref 4–11)

## 2021-02-20 PROCEDURE — 83883 ASSAY NEPHELOMETRY NOT SPEC: CPT

## 2021-02-20 PROCEDURE — 86664 EPSTEIN-BARR NUCLEAR ANTIGEN: CPT

## 2021-02-20 PROCEDURE — 86665 EPSTEIN-BARR CAPSID VCA: CPT

## 2021-02-20 PROCEDURE — 36415 COLL VENOUS BLD VENIPUNCTURE: CPT

## 2021-02-20 PROCEDURE — 86308 HETEROPHILE ANTIBODY SCREEN: CPT

## 2021-02-20 PROCEDURE — 86334 IMMUNOFIX E-PHORESIS SERUM: CPT

## 2021-02-20 PROCEDURE — 84165 PROTEIN E-PHORESIS SERUM: CPT

## 2021-02-20 PROCEDURE — 85025 COMPLETE CBC W/AUTO DIFF WBC: CPT

## 2021-02-22 LAB
ALBUMIN SERPL ELPH-MCNC: 4.25 G/DL (ref 3.75–5.21)
ALBUMIN/GLOB SERPL: 1.2 {RATIO} (ref 1–2)
ALPHA1 GLOB SERPL ELPH-MCNC: 0.5 G/DL (ref 0.19–0.46)
ALPHA2 GLOB SERPL ELPH-MCNC: 1.11 G/DL (ref 0.48–1.05)
B-GLOBULIN SERPL ELPH-MCNC: 0.89 G/DL (ref 0.68–1.23)
GAMMA GLOB SERPL ELPH-MCNC: 1.05 G/DL (ref 0.62–1.7)
KAPPA LC FREE SER-MCNC: 1.67 MG/DL (ref 0.33–1.94)
KAPPA LC FREE/LAMBDA FREE SER NEPH: 0.92 {RATIO} (ref 0.26–1.65)
LAMBDA LC FREE SERPL-MCNC: 1.81 MG/DL (ref 0.57–2.63)
M PROTEIN MFR SERPL ELPH: 7.8 G/DL (ref 6.4–8.2)

## 2021-02-23 LAB — HETEROPH AB SER QL: NEGATIVE

## 2021-02-24 LAB
EBV NA IGG SER QL IA: POSITIVE
EBV VCA IGG SER QL IA: POSITIVE
EBV VCA IGM SER QL IA: NEGATIVE

## 2021-12-26 ENCOUNTER — HOSPITAL ENCOUNTER (EMERGENCY)
Facility: HOSPITAL | Age: 21
Discharge: HOME | End: 2021-12-26
Attending: EMERGENCY MEDICINE
Payer: COMMERCIAL

## 2021-12-26 VITALS
TEMPERATURE: 100 F | WEIGHT: 115 LBS | OXYGEN SATURATION: 98 % | SYSTOLIC BLOOD PRESSURE: 108 MMHG | HEART RATE: 109 BPM | DIASTOLIC BLOOD PRESSURE: 76 MMHG | BODY MASS INDEX: 18 KG/M2 | RESPIRATION RATE: 16 BRPM

## 2021-12-26 DIAGNOSIS — G43.809 OTHER MIGRAINE WITHOUT STATUS MIGRAINOSUS, NOT INTRACTABLE: ICD-10-CM

## 2021-12-26 DIAGNOSIS — R11.0 NAUSEA: Primary | ICD-10-CM

## 2021-12-26 LAB
ALBUMIN SERPL-MCNC: 3.5 G/DL (ref 3.4–5)
ALBUMIN/GLOB SERPL: 0.8 {RATIO} (ref 1–2)
ALP LIVER SERPL-CCNC: 134 U/L
ALT SERPL-CCNC: 111 U/L
ANION GAP SERPL CALC-SCNC: 7 MMOL/L (ref 0–18)
AST SERPL-CCNC: 34 U/L (ref 15–37)
BASOPHILS # BLD AUTO: 0.02 X10(3) UL (ref 0–0.2)
BASOPHILS NFR BLD AUTO: 0.2 %
BILIRUB SERPL-MCNC: 1.1 MG/DL (ref 0.1–2)
BILIRUB UR QL STRIP.AUTO: NEGATIVE
BUN BLD-MCNC: 6 MG/DL (ref 7–18)
CALCIUM BLD-MCNC: 9.4 MG/DL (ref 8.5–10.1)
CHLORIDE SERPL-SCNC: 106 MMOL/L (ref 98–112)
CLARITY UR REFRACT.AUTO: CLEAR
CO2 SERPL-SCNC: 23 MMOL/L (ref 21–32)
COLOR UR AUTO: YELLOW
CREAT BLD-MCNC: 0.68 MG/DL
EOSINOPHIL # BLD AUTO: 0.03 X10(3) UL (ref 0–0.7)
EOSINOPHIL NFR BLD AUTO: 0.3 %
ERYTHROCYTE [DISTWIDTH] IN BLOOD BY AUTOMATED COUNT: 11.9 %
GLOBULIN PLAS-MCNC: 4.5 G/DL (ref 2.8–4.4)
GLUCOSE BLD-MCNC: 85 MG/DL (ref 70–99)
GLUCOSE UR STRIP.AUTO-MCNC: NEGATIVE MG/DL
HCT VFR BLD AUTO: 42.8 %
HGB BLD-MCNC: 14.4 G/DL
IMM GRANULOCYTES # BLD AUTO: 0.04 X10(3) UL (ref 0–1)
IMM GRANULOCYTES NFR BLD: 0.4 %
KETONES UR STRIP.AUTO-MCNC: 20 MG/DL
LEUKOCYTE ESTERASE UR QL STRIP.AUTO: NEGATIVE
LIPASE SERPL-CCNC: 91 U/L (ref 73–393)
LYMPHOCYTES # BLD AUTO: 1.34 X10(3) UL (ref 1–4)
LYMPHOCYTES NFR BLD AUTO: 14.1 %
MCH RBC QN AUTO: 31.2 PG (ref 26–34)
MCHC RBC AUTO-ENTMCNC: 33.6 G/DL (ref 31–37)
MCV RBC AUTO: 92.8 FL
MONOCYTES # BLD AUTO: 1.23 X10(3) UL (ref 0.1–1)
MONOCYTES NFR BLD AUTO: 12.9 %
NEUTROPHILS # BLD AUTO: 6.84 X10 (3) UL (ref 1.5–7.7)
NEUTROPHILS # BLD AUTO: 6.84 X10(3) UL (ref 1.5–7.7)
NEUTROPHILS NFR BLD AUTO: 72.1 %
NITRITE UR QL STRIP.AUTO: NEGATIVE
OSMOLALITY SERPL CALC.SUM OF ELEC: 279 MOSM/KG (ref 275–295)
PH UR STRIP.AUTO: 6 [PH] (ref 5–8)
PLATELET # BLD AUTO: 199 10(3)UL (ref 150–450)
POTASSIUM SERPL-SCNC: 3.6 MMOL/L (ref 3.5–5.1)
PROT SERPL-MCNC: 8 G/DL (ref 6.4–8.2)
PROT UR STRIP.AUTO-MCNC: NEGATIVE MG/DL
RBC # BLD AUTO: 4.61 X10(6)UL
RBC UR QL AUTO: NEGATIVE
SODIUM SERPL-SCNC: 136 MMOL/L (ref 136–145)
SP GR UR STRIP.AUTO: 1.01 (ref 1–1.03)
UROBILINOGEN UR STRIP.AUTO-MCNC: <2 MG/DL
WBC # BLD AUTO: 9.5 X10(3) UL (ref 4–11)

## 2021-12-26 PROCEDURE — 85025 COMPLETE CBC W/AUTO DIFF WBC: CPT | Performed by: EMERGENCY MEDICINE

## 2021-12-26 PROCEDURE — 96361 HYDRATE IV INFUSION ADD-ON: CPT | Performed by: EMERGENCY MEDICINE

## 2021-12-26 PROCEDURE — 99284 EMERGENCY DEPT VISIT MOD MDM: CPT | Performed by: EMERGENCY MEDICINE

## 2021-12-26 PROCEDURE — 96375 TX/PRO/DX INJ NEW DRUG ADDON: CPT | Performed by: EMERGENCY MEDICINE

## 2021-12-26 PROCEDURE — 96374 THER/PROPH/DIAG INJ IV PUSH: CPT | Performed by: EMERGENCY MEDICINE

## 2021-12-26 PROCEDURE — 80053 COMPREHEN METABOLIC PANEL: CPT | Performed by: EMERGENCY MEDICINE

## 2021-12-26 PROCEDURE — 81001 URINALYSIS AUTO W/SCOPE: CPT | Performed by: EMERGENCY MEDICINE

## 2021-12-26 PROCEDURE — 83690 ASSAY OF LIPASE: CPT | Performed by: EMERGENCY MEDICINE

## 2021-12-26 RX ORDER — DIPHENHYDRAMINE HYDROCHLORIDE 50 MG/ML
25 INJECTION INTRAMUSCULAR; INTRAVENOUS ONCE
Status: COMPLETED | OUTPATIENT
Start: 2021-12-26 | End: 2021-12-26

## 2021-12-26 RX ORDER — LORAZEPAM 2 MG/ML
1 INJECTION INTRAMUSCULAR ONCE
Status: COMPLETED | OUTPATIENT
Start: 2021-12-26 | End: 2021-12-26

## 2021-12-26 RX ORDER — ONDANSETRON 2 MG/ML
4 INJECTION INTRAMUSCULAR; INTRAVENOUS ONCE
Status: COMPLETED | OUTPATIENT
Start: 2021-12-26 | End: 2021-12-26

## 2021-12-26 RX ORDER — KETOROLAC TROMETHAMINE 30 MG/ML
15 INJECTION, SOLUTION INTRAMUSCULAR; INTRAVENOUS ONCE
Status: COMPLETED | OUTPATIENT
Start: 2021-12-26 | End: 2021-12-26

## 2021-12-26 RX ORDER — ONDANSETRON 4 MG/1
4 TABLET, ORALLY DISINTEGRATING ORAL EVERY 4 HOURS PRN
Qty: 10 TABLET | Refills: 0 | Status: SHIPPED | OUTPATIENT
Start: 2021-12-26 | End: 2022-01-02

## 2021-12-26 RX ORDER — ACETAMINOPHEN 500 MG
1000 TABLET ORAL ONCE
Status: COMPLETED | OUTPATIENT
Start: 2021-12-26 | End: 2021-12-26

## 2021-12-26 RX ORDER — LORAZEPAM 2 MG/ML
INJECTION INTRAMUSCULAR
Status: COMPLETED
Start: 2021-12-26 | End: 2021-12-26

## 2021-12-26 RX ORDER — METOCLOPRAMIDE HYDROCHLORIDE 5 MG/ML
10 INJECTION INTRAMUSCULAR; INTRAVENOUS ONCE
Status: COMPLETED | OUTPATIENT
Start: 2021-12-26 | End: 2021-12-26

## 2021-12-26 NOTE — ED PROVIDER NOTES
Patient Seen in: [facility] Emergency Department      History   Patient presents with:   Body ache and/or chills  Abdomen/Flank Pain    Stated Complaint: chills, abd pain since 12/24    Subjective:   HPI    24-year-old female presents to the emergenc Pulse 109   Temp 99.7 °F (37.6 °C) (Oral)   Resp 16   Wt 52.2 kg   LMP 12/05/2021   SpO2 98%   BMI 18.01 kg/m²         Physical Exam    General appearance: This is a young adult female sitting on a gurney. Vital signs were reviewed per nurse's notes.   Tem antiemetics were given. Patient complained of a persistent headache consistent with her migraines. Toradol was administered and when this did not provide any significant improvement, she was given Reglan and Benadryl.   These medications made her jitter

## 2022-02-01 NOTE — ED INITIAL ASSESSMENT (HPI)
Pt arrives c/o sharp intermittent cramping to [facility] since colonoscopy at 1200 today. Mild nausea no emesis. Denies any blood from rectum. Denies any urinary complaints. Pt on oral contraceptives and does not have regular periods. LMP approx 1 month ago. A&A.
denies pain/discomfort

## 2022-12-27 ENCOUNTER — HOSPITAL ENCOUNTER (EMERGENCY)
Facility: HOSPITAL | Age: 22
Discharge: HOME | End: 2022-12-27
Attending: EMERGENCY MEDICINE
Payer: COMMERCIAL

## 2022-12-27 VITALS
WEIGHT: 118 LBS | RESPIRATION RATE: 18 BRPM | HEIGHT: 67 IN | OXYGEN SATURATION: 100 % | HEART RATE: 87 BPM | DIASTOLIC BLOOD PRESSURE: 73 MMHG | BODY MASS INDEX: 18.52 KG/M2 | SYSTOLIC BLOOD PRESSURE: 106 MMHG | TEMPERATURE: 98 F

## 2022-12-27 DIAGNOSIS — J45.901 MILD ASTHMA WITH EXACERBATION, UNSPECIFIED WHETHER PERSISTENT: Primary | ICD-10-CM

## 2022-12-27 PROCEDURE — 99282 EMERGENCY DEPT VISIT SF MDM: CPT

## 2023-01-30 ENCOUNTER — LAB REQUISITION (OUTPATIENT)
Dept: OBGYN | Age: 23
End: 2023-01-30

## 2023-01-30 DIAGNOSIS — R87.810 CERVICAL HIGH RISK HUMAN PAPILLOMAVIRUS (HPV) DNA TEST POSITIVE: ICD-10-CM

## 2023-01-30 DIAGNOSIS — Z11.3 ENCOUNTER FOR SCREENING FOR INFECTIONS WITH A PREDOMINANTLY SEXUAL MODE OF TRANSMISSION: ICD-10-CM

## 2023-01-30 PROCEDURE — 88175 CYTOPATH C/V AUTO FLUID REDO: CPT | Performed by: CLINICAL MEDICAL LABORATORY

## 2023-01-30 PROCEDURE — 87491 CHLMYD TRACH DNA AMP PROBE: CPT | Performed by: CLINICAL MEDICAL LABORATORY

## 2023-01-30 PROCEDURE — 87591 N.GONORRHOEAE DNA AMP PROB: CPT | Performed by: CLINICAL MEDICAL LABORATORY

## 2023-01-30 PROCEDURE — 87624 HPV HI-RISK TYP POOLED RSLT: CPT | Performed by: CLINICAL MEDICAL LABORATORY

## 2023-01-31 LAB
C TRACH RRNA CVX QL NAA+PROBE: NEGATIVE
Lab: NORMAL
N GONORRHOEA RRNA CVX QL NAA+PROBE: NEGATIVE

## 2023-02-06 LAB
CASE RPRT: ABNORMAL
CLINICAL INFO: ABNORMAL
CYTOLOGY CVX/VAG STUDY: ABNORMAL
CYTOLOGY CVX/VAG STUDY: ABNORMAL
GEN CATEG CVX/VAG CYTO-IMP: ABNORMAL
HPV16+18+45 E6+E7MRNA CVX NAA+PROBE: POSITIVE
Lab: ABNORMAL
PAP EDUCATIONAL NOTE: ABNORMAL
SPECIMEN ADEQUACY: ABNORMAL

## 2023-03-08 ENCOUNTER — LAB REQUISITION (OUTPATIENT)
Dept: OBGYN | Age: 23
End: 2023-03-08

## 2023-03-08 DIAGNOSIS — R87.611 ATYPICAL SQUAMOUS CELLS CANNOT EXCLUDE HIGH GRADE SQUAMOUS INTRAEPITHELIAL LESION ON CYTOLOGIC SMEAR OF CERVIX (ASC-H): ICD-10-CM

## 2023-03-08 PROCEDURE — 88305 TISSUE EXAM BY PATHOLOGIST: CPT | Performed by: CLINICAL MEDICAL LABORATORY

## 2023-03-29 ENCOUNTER — HOSPITAL ENCOUNTER (EMERGENCY)
Age: 23
Discharge: HOME | End: 2023-03-29
Attending: EMERGENCY MEDICINE
Payer: COMMERCIAL

## 2023-03-29 VITALS
WEIGHT: 118 LBS | OXYGEN SATURATION: 100 % | BODY MASS INDEX: 18.52 KG/M2 | HEART RATE: 95 BPM | DIASTOLIC BLOOD PRESSURE: 84 MMHG | SYSTOLIC BLOOD PRESSURE: 127 MMHG | TEMPERATURE: 98 F | RESPIRATION RATE: 16 BRPM | HEIGHT: 67 IN

## 2023-03-29 DIAGNOSIS — E87.6 HYPOKALEMIA: ICD-10-CM

## 2023-03-29 DIAGNOSIS — R10.9 ABDOMINAL PAIN OF UNKNOWN ETIOLOGY: Primary | ICD-10-CM

## 2023-03-29 LAB
ALBUMIN SERPL-MCNC: 4.3 G/DL (ref 3.4–5)
ALBUMIN/GLOB SERPL: 1.1 {RATIO} (ref 1–2)
ALP LIVER SERPL-CCNC: 200 U/L
ALT SERPL-CCNC: 125 U/L
ANION GAP SERPL CALC-SCNC: 8 MMOL/L (ref 0–18)
AST SERPL-CCNC: 38 U/L (ref 15–37)
BASOPHILS # BLD AUTO: 0.03 X10(3) UL (ref 0–0.2)
BASOPHILS NFR BLD AUTO: 0.2 %
BILIRUB SERPL-MCNC: 0.7 MG/DL (ref 0.1–2)
BUN BLD-MCNC: 15 MG/DL (ref 7–18)
CALCIUM BLD-MCNC: 9.1 MG/DL (ref 8.5–10.1)
CHLORIDE SERPL-SCNC: 107 MMOL/L (ref 98–112)
CO2 SERPL-SCNC: 22 MMOL/L (ref 21–32)
CREAT BLD-MCNC: 0.87 MG/DL
EOSINOPHIL # BLD AUTO: 0.03 X10(3) UL (ref 0–0.7)
EOSINOPHIL NFR BLD AUTO: 0.2 %
ERYTHROCYTE [DISTWIDTH] IN BLOOD BY AUTOMATED COUNT: 12.3 %
GFR SERPLBLD BASED ON 1.73 SQ M-ARVRAT: 95 ML/MIN/1.73M2 (ref 60–?)
GLOBULIN PLAS-MCNC: 3.9 G/DL (ref 2.8–4.4)
GLUCOSE BLD-MCNC: 93 MG/DL (ref 70–99)
HCT VFR BLD AUTO: 43.6 %
HGB BLD-MCNC: 15.1 G/DL
IMM GRANULOCYTES # BLD AUTO: 0.03 X10(3) UL (ref 0–1)
IMM GRANULOCYTES NFR BLD: 0.2 %
LIPASE SERPL-CCNC: 46 U/L (ref 13–75)
LYMPHOCYTES # BLD AUTO: 2.87 X10(3) UL (ref 1–4)
LYMPHOCYTES NFR BLD AUTO: 23.8 %
MAGNESIUM SERPL-MCNC: 2.2 MG/DL (ref 1.6–2.6)
MCH RBC QN AUTO: 30.8 PG (ref 26–34)
MCHC RBC AUTO-ENTMCNC: 34.6 G/DL (ref 31–37)
MCV RBC AUTO: 88.8 FL
MONOCYTES # BLD AUTO: 0.8 X10(3) UL (ref 0.1–1)
MONOCYTES NFR BLD AUTO: 6.6 %
NEUTROPHILS # BLD AUTO: 8.32 X10 (3) UL (ref 1.5–7.7)
NEUTROPHILS # BLD AUTO: 8.32 X10(3) UL (ref 1.5–7.7)
NEUTROPHILS NFR BLD AUTO: 69 %
OSMOLALITY SERPL CALC.SUM OF ELEC: 285 MOSM/KG (ref 275–295)
PLATELET # BLD AUTO: 263 10(3)UL (ref 150–450)
POTASSIUM SERPL-SCNC: 3 MMOL/L (ref 3.5–5.1)
PROT SERPL-MCNC: 8.2 G/DL (ref 6.4–8.2)
RBC # BLD AUTO: 4.91 X10(6)UL
SODIUM SERPL-SCNC: 137 MMOL/L (ref 136–145)
WBC # BLD AUTO: 12.1 X10(3) UL (ref 4–11)

## 2023-03-29 PROCEDURE — 80053 COMPREHEN METABOLIC PANEL: CPT | Performed by: EMERGENCY MEDICINE

## 2023-03-29 PROCEDURE — 83735 ASSAY OF MAGNESIUM: CPT | Performed by: EMERGENCY MEDICINE

## 2023-03-29 PROCEDURE — 96360 HYDRATION IV INFUSION INIT: CPT

## 2023-03-29 PROCEDURE — 99284 EMERGENCY DEPT VISIT MOD MDM: CPT

## 2023-03-29 PROCEDURE — 85025 COMPLETE CBC W/AUTO DIFF WBC: CPT | Performed by: EMERGENCY MEDICINE

## 2023-03-29 PROCEDURE — 83690 ASSAY OF LIPASE: CPT | Performed by: EMERGENCY MEDICINE

## 2023-03-29 RX ORDER — POTASSIUM CHLORIDE 20 MEQ/1
20 TABLET, EXTENDED RELEASE ORAL 2 TIMES DAILY
Qty: 20 TABLET | Refills: 0 | Status: SHIPPED | OUTPATIENT
Start: 2023-03-29 | End: 2023-04-08

## 2023-03-30 ENCOUNTER — HOSPITAL ENCOUNTER (OUTPATIENT)
Facility: HOSPITAL | Age: 23
Setting detail: OBSERVATION
Discharge: HOME | End: 2023-03-31
Attending: EMERGENCY MEDICINE | Admitting: HOSPITALIST
Payer: COMMERCIAL

## 2023-03-30 DIAGNOSIS — K35.80 ACUTE APPENDICITIS, UNSPECIFIED ACUTE APPENDICITIS TYPE: Primary | ICD-10-CM

## 2023-03-30 DIAGNOSIS — K37 APPENDICITIS: ICD-10-CM

## 2023-03-30 LAB
B-HCG UR QL: NEGATIVE
BASOPHILS # BLD AUTO: 0.05 X10(3) UL (ref 0–0.2)
BASOPHILS NFR BLD AUTO: 0.5 %
BILIRUB UR QL STRIP.AUTO: NEGATIVE
CLARITY UR REFRACT.AUTO: CLEAR
COLOR UR AUTO: YELLOW
EOSINOPHIL # BLD AUTO: 0.1 X10(3) UL (ref 0–0.7)
EOSINOPHIL NFR BLD AUTO: 1.1 %
ERYTHROCYTE [DISTWIDTH] IN BLOOD BY AUTOMATED COUNT: 12.2 %
GLUCOSE UR STRIP.AUTO-MCNC: NEGATIVE MG/DL
HCT VFR BLD AUTO: 40.5 %
HGB BLD-MCNC: 13.8 G/DL
IMM GRANULOCYTES # BLD AUTO: 0.02 X10(3) UL (ref 0–1)
IMM GRANULOCYTES NFR BLD: 0.2 %
KETONES UR STRIP.AUTO-MCNC: NEGATIVE MG/DL
LEUKOCYTE ESTERASE UR QL STRIP.AUTO: NEGATIVE
LYMPHOCYTES # BLD AUTO: 2.67 X10(3) UL (ref 1–4)
LYMPHOCYTES NFR BLD AUTO: 28.6 %
MCH RBC QN AUTO: 30.7 PG (ref 26–34)
MCHC RBC AUTO-ENTMCNC: 34.1 G/DL (ref 31–37)
MCV RBC AUTO: 90.2 FL
MONOCYTES # BLD AUTO: 0.83 X10(3) UL (ref 0.1–1)
MONOCYTES NFR BLD AUTO: 8.9 %
NEUTROPHILS # BLD AUTO: 5.66 X10 (3) UL (ref 1.5–7.7)
NEUTROPHILS # BLD AUTO: 5.66 X10(3) UL (ref 1.5–7.7)
NEUTROPHILS NFR BLD AUTO: 60.7 %
NITRITE UR QL STRIP.AUTO: NEGATIVE
PH UR STRIP.AUTO: 6.5 [PH] (ref 5–8)
PLATELET # BLD AUTO: 239 10(3)UL (ref 150–450)
PROT UR STRIP.AUTO-MCNC: NEGATIVE MG/DL
RBC # BLD AUTO: 4.49 X10(6)UL
RBC UR QL AUTO: NEGATIVE
SP GR UR STRIP.AUTO: 1.01 (ref 1–1.03)
UROBILINOGEN UR STRIP.AUTO-MCNC: 0.2 MG/DL
WBC # BLD AUTO: 9.3 X10(3) UL (ref 4–11)

## 2023-03-31 ENCOUNTER — ANESTHESIA EVENT (OUTPATIENT)
Dept: SURGERY | Facility: HOSPITAL | Age: 23
End: 2023-03-31
Payer: COMMERCIAL

## 2023-03-31 ENCOUNTER — APPOINTMENT (OUTPATIENT)
Dept: CT IMAGING | Age: 23
End: 2023-03-31
Attending: EMERGENCY MEDICINE
Payer: COMMERCIAL

## 2023-03-31 ENCOUNTER — ANESTHESIA (OUTPATIENT)
Dept: SURGERY | Facility: HOSPITAL | Age: 23
End: 2023-03-31
Payer: COMMERCIAL

## 2023-03-31 VITALS
SYSTOLIC BLOOD PRESSURE: 126 MMHG | DIASTOLIC BLOOD PRESSURE: 96 MMHG | WEIGHT: 118.88 LBS | BODY MASS INDEX: 18.66 KG/M2 | TEMPERATURE: 99 F | RESPIRATION RATE: 13 BRPM | HEART RATE: 84 BPM | OXYGEN SATURATION: 98 % | HEIGHT: 67 IN

## 2023-03-31 PROBLEM — J45.20 MILD INTERMITTENT ASTHMA WITHOUT COMPLICATION: Chronic | Status: ACTIVE | Noted: 2023-03-31

## 2023-03-31 PROBLEM — K37 APPENDICITIS: Status: ACTIVE | Noted: 2023-03-31

## 2023-03-31 PROBLEM — K35.80 ACUTE APPENDICITIS, UNSPECIFIED ACUTE APPENDICITIS TYPE: Status: ACTIVE | Noted: 2023-03-31

## 2023-03-31 LAB
ALBUMIN SERPL-MCNC: 3.6 G/DL (ref 3.4–5)
ALBUMIN/GLOB SERPL: 0.9 {RATIO} (ref 1–2)
ALP LIVER SERPL-CCNC: 175 U/L
ALT SERPL-CCNC: 122 U/L
ANION GAP SERPL CALC-SCNC: 7 MMOL/L (ref 0–18)
APTT PPP: 28.9 SECONDS (ref 23.3–35.6)
AST SERPL-CCNC: 52 U/L (ref 15–37)
BILIRUB SERPL-MCNC: 0.4 MG/DL (ref 0.1–2)
BUN BLD-MCNC: 13 MG/DL (ref 7–18)
CALCIUM BLD-MCNC: 8.9 MG/DL (ref 8.5–10.1)
CHLORIDE SERPL-SCNC: 108 MMOL/L (ref 98–112)
CO2 SERPL-SCNC: 24 MMOL/L (ref 21–32)
CREAT BLD-MCNC: 0.88 MG/DL
GFR SERPLBLD BASED ON 1.73 SQ M-ARVRAT: 94 ML/MIN/1.73M2 (ref 60–?)
GLOBULIN PLAS-MCNC: 3.9 G/DL (ref 2.8–4.4)
GLUCOSE BLD-MCNC: 106 MG/DL (ref 70–99)
INR BLD: 1.01 (ref 0.85–1.16)
OSMOLALITY SERPL CALC.SUM OF ELEC: 289 MOSM/KG (ref 275–295)
POTASSIUM SERPL-SCNC: 3.6 MMOL/L (ref 3.5–5.1)
PROT SERPL-MCNC: 7.5 G/DL (ref 6.4–8.2)
PROTHROMBIN TIME: 13.3 SECONDS (ref 11.6–14.8)
SARS-COV-2 RNA RESP QL NAA+PROBE: NOT DETECTED
SODIUM SERPL-SCNC: 139 MMOL/L (ref 136–145)

## 2023-03-31 PROCEDURE — 0DTJ4ZZ RESECTION OF APPENDIX, PERCUTANEOUS ENDOSCOPIC APPROACH: ICD-10-PCS | Performed by: STUDENT IN AN ORGANIZED HEALTH CARE EDUCATION/TRAINING PROGRAM

## 2023-03-31 PROCEDURE — 74177 CT ABD & PELVIS W/CONTRAST: CPT | Performed by: EMERGENCY MEDICINE

## 2023-03-31 PROCEDURE — 99202 OFFICE O/P NEW SF 15 MIN: CPT | Performed by: STUDENT IN AN ORGANIZED HEALTH CARE EDUCATION/TRAINING PROGRAM

## 2023-03-31 PROCEDURE — 99223 1ST HOSP IP/OBS HIGH 75: CPT | Performed by: HOSPITALIST

## 2023-03-31 PROCEDURE — 44970 LAPAROSCOPY APPENDECTOMY: CPT | Performed by: PHYSICIAN ASSISTANT

## 2023-03-31 PROCEDURE — 44970 LAPAROSCOPY APPENDECTOMY: CPT | Performed by: STUDENT IN AN ORGANIZED HEALTH CARE EDUCATION/TRAINING PROGRAM

## 2023-03-31 RX ORDER — MIDAZOLAM HYDROCHLORIDE 1 MG/ML
INJECTION INTRAMUSCULAR; INTRAVENOUS AS NEEDED
Status: DISCONTINUED | OUTPATIENT
Start: 2023-03-31 | End: 2023-03-31 | Stop reason: SURG

## 2023-03-31 RX ORDER — PROCHLORPERAZINE EDISYLATE 5 MG/ML
5 INJECTION INTRAMUSCULAR; INTRAVENOUS EVERY 8 HOURS PRN
Status: DISCONTINUED | OUTPATIENT
Start: 2023-03-31 | End: 2023-03-31 | Stop reason: HOSPADM

## 2023-03-31 RX ORDER — MIDAZOLAM HYDROCHLORIDE 1 MG/ML
1 INJECTION INTRAMUSCULAR; INTRAVENOUS EVERY 5 MIN PRN
Status: DISCONTINUED | OUTPATIENT
Start: 2023-03-31 | End: 2023-03-31 | Stop reason: HOSPADM

## 2023-03-31 RX ORDER — ACETAMINOPHEN 500 MG
500 TABLET ORAL EVERY 4 HOURS PRN
Status: DISCONTINUED | OUTPATIENT
Start: 2023-03-31 | End: 2023-03-31

## 2023-03-31 RX ORDER — ONDANSETRON 2 MG/ML
4 INJECTION INTRAMUSCULAR; INTRAVENOUS EVERY 6 HOURS PRN
Status: DISCONTINUED | OUTPATIENT
Start: 2023-03-31 | End: 2023-03-31 | Stop reason: HOSPADM

## 2023-03-31 RX ORDER — NALOXONE HYDROCHLORIDE 0.4 MG/ML
80 INJECTION, SOLUTION INTRAMUSCULAR; INTRAVENOUS; SUBCUTANEOUS AS NEEDED
Status: DISCONTINUED | OUTPATIENT
Start: 2023-03-31 | End: 2023-03-31 | Stop reason: HOSPADM

## 2023-03-31 RX ORDER — VANCOMYCIN HYDROCHLORIDE 125 MG/1
125 CAPSULE ORAL 4 TIMES DAILY
Status: DISCONTINUED | OUTPATIENT
Start: 2023-03-31 | End: 2023-03-31

## 2023-03-31 RX ORDER — MORPHINE SULFATE 2 MG/ML
1 INJECTION, SOLUTION INTRAMUSCULAR; INTRAVENOUS EVERY 2 HOUR PRN
Status: DISCONTINUED | OUTPATIENT
Start: 2023-03-31 | End: 2023-03-31

## 2023-03-31 RX ORDER — SODIUM CHLORIDE 9 MG/ML
INJECTION, SOLUTION INTRAVENOUS CONTINUOUS
Status: DISCONTINUED | OUTPATIENT
Start: 2023-03-31 | End: 2023-03-31

## 2023-03-31 RX ORDER — GLYCOPYRROLATE 0.2 MG/ML
INJECTION, SOLUTION INTRAMUSCULAR; INTRAVENOUS AS NEEDED
Status: DISCONTINUED | OUTPATIENT
Start: 2023-03-31 | End: 2023-03-31 | Stop reason: SURG

## 2023-03-31 RX ORDER — HYDROCODONE BITARTRATE AND ACETAMINOPHEN 5; 325 MG/1; MG/1
2 TABLET ORAL ONCE AS NEEDED
Status: DISCONTINUED | OUTPATIENT
Start: 2023-03-31 | End: 2023-03-31 | Stop reason: HOSPADM

## 2023-03-31 RX ORDER — BUPIVACAINE HYDROCHLORIDE 2.5 MG/ML
INJECTION, SOLUTION EPIDURAL; INFILTRATION; INTRACAUDAL AS NEEDED
Status: DISCONTINUED | OUTPATIENT
Start: 2023-03-31 | End: 2023-03-31 | Stop reason: HOSPADM

## 2023-03-31 RX ORDER — LIDOCAINE HYDROCHLORIDE 10 MG/ML
INJECTION, SOLUTION EPIDURAL; INFILTRATION; INTRACAUDAL; PERINEURAL AS NEEDED
Status: DISCONTINUED | OUTPATIENT
Start: 2023-03-31 | End: 2023-03-31 | Stop reason: SURG

## 2023-03-31 RX ORDER — ENOXAPARIN SODIUM 100 MG/ML
40 INJECTION SUBCUTANEOUS DAILY
Status: DISCONTINUED | OUTPATIENT
Start: 2023-03-31 | End: 2023-03-31

## 2023-03-31 RX ORDER — HYDROCODONE BITARTRATE AND ACETAMINOPHEN 5; 325 MG/1; MG/1
1 TABLET ORAL ONCE AS NEEDED
Status: CANCELLED | OUTPATIENT
Start: 2023-03-31 | End: 2023-03-31

## 2023-03-31 RX ORDER — IOHEXOL 350 MG/ML
65 INJECTION, SOLUTION INTRAVENOUS
Status: COMPLETED | OUTPATIENT
Start: 2023-03-31 | End: 2023-03-31

## 2023-03-31 RX ORDER — MEPERIDINE HYDROCHLORIDE 25 MG/ML
12.5 INJECTION INTRAMUSCULAR; INTRAVENOUS; SUBCUTANEOUS AS NEEDED
Status: DISCONTINUED | OUTPATIENT
Start: 2023-03-31 | End: 2023-03-31 | Stop reason: HOSPADM

## 2023-03-31 RX ORDER — DIPHENHYDRAMINE HYDROCHLORIDE 50 MG/ML
12.5 INJECTION INTRAMUSCULAR; INTRAVENOUS AS NEEDED
Status: DISCONTINUED | OUTPATIENT
Start: 2023-03-31 | End: 2023-03-31 | Stop reason: HOSPADM

## 2023-03-31 RX ORDER — ACETAMINOPHEN 500 MG
1000 TABLET ORAL ONCE AS NEEDED
Status: CANCELLED | OUTPATIENT
Start: 2023-03-31 | End: 2023-03-31

## 2023-03-31 RX ORDER — DIPHENHYDRAMINE HYDROCHLORIDE 50 MG/ML
12.5 INJECTION INTRAMUSCULAR; INTRAVENOUS AS NEEDED
Status: CANCELLED | OUTPATIENT
Start: 2023-03-31 | End: 2023-03-31

## 2023-03-31 RX ORDER — HYDROMORPHONE HYDROCHLORIDE 1 MG/ML
0.2 INJECTION, SOLUTION INTRAMUSCULAR; INTRAVENOUS; SUBCUTANEOUS EVERY 5 MIN PRN
Status: CANCELLED | OUTPATIENT
Start: 2023-03-31 | End: 2023-03-31

## 2023-03-31 RX ORDER — HYDROMORPHONE HYDROCHLORIDE 1 MG/ML
0.4 INJECTION, SOLUTION INTRAMUSCULAR; INTRAVENOUS; SUBCUTANEOUS EVERY 5 MIN PRN
Status: DISCONTINUED | OUTPATIENT
Start: 2023-03-31 | End: 2023-03-31 | Stop reason: HOSPADM

## 2023-03-31 RX ORDER — HYDROCODONE BITARTRATE AND ACETAMINOPHEN 5; 325 MG/1; MG/1
1 TABLET ORAL ONCE AS NEEDED
Status: DISCONTINUED | OUTPATIENT
Start: 2023-03-31 | End: 2023-03-31 | Stop reason: HOSPADM

## 2023-03-31 RX ORDER — PROCHLORPERAZINE EDISYLATE 5 MG/ML
5 INJECTION INTRAMUSCULAR; INTRAVENOUS EVERY 8 HOURS PRN
Status: DISCONTINUED | OUTPATIENT
Start: 2023-03-31 | End: 2023-03-31

## 2023-03-31 RX ORDER — KETOROLAC TROMETHAMINE 30 MG/ML
INJECTION, SOLUTION INTRAMUSCULAR; INTRAVENOUS AS NEEDED
Status: DISCONTINUED | OUTPATIENT
Start: 2023-03-31 | End: 2023-03-31 | Stop reason: SURG

## 2023-03-31 RX ORDER — ALBUTEROL SULFATE 90 UG/1
2 AEROSOL, METERED RESPIRATORY (INHALATION) EVERY 6 HOURS PRN
Status: DISCONTINUED | OUTPATIENT
Start: 2023-03-31 | End: 2023-03-31

## 2023-03-31 RX ORDER — BISACODYL 10 MG
10 SUPPOSITORY, RECTAL RECTAL
Status: DISCONTINUED | OUTPATIENT
Start: 2023-03-31 | End: 2023-03-31

## 2023-03-31 RX ORDER — ONDANSETRON 2 MG/ML
INJECTION INTRAMUSCULAR; INTRAVENOUS AS NEEDED
Status: DISCONTINUED | OUTPATIENT
Start: 2023-03-31 | End: 2023-03-31 | Stop reason: SURG

## 2023-03-31 RX ORDER — RIZATRIPTAN BENZOATE 10 MG/1
10 TABLET ORAL AS NEEDED
COMMUNITY

## 2023-03-31 RX ORDER — PROCHLORPERAZINE EDISYLATE 5 MG/ML
5 INJECTION INTRAMUSCULAR; INTRAVENOUS EVERY 8 HOURS PRN
Status: CANCELLED | OUTPATIENT
Start: 2023-03-31

## 2023-03-31 RX ORDER — SODIUM CHLORIDE, SODIUM LACTATE, POTASSIUM CHLORIDE, CALCIUM CHLORIDE 600; 310; 30; 20 MG/100ML; MG/100ML; MG/100ML; MG/100ML
INJECTION, SOLUTION INTRAVENOUS CONTINUOUS PRN
Status: DISCONTINUED | OUTPATIENT
Start: 2023-03-31 | End: 2023-03-31 | Stop reason: SURG

## 2023-03-31 RX ORDER — MEPERIDINE HYDROCHLORIDE 25 MG/ML
12.5 INJECTION INTRAMUSCULAR; INTRAVENOUS; SUBCUTANEOUS AS NEEDED
Status: CANCELLED | OUTPATIENT
Start: 2023-03-31

## 2023-03-31 RX ORDER — SODIUM CHLORIDE, SODIUM LACTATE, POTASSIUM CHLORIDE, CALCIUM CHLORIDE 600; 310; 30; 20 MG/100ML; MG/100ML; MG/100ML; MG/100ML
INJECTION, SOLUTION INTRAVENOUS CONTINUOUS
Status: CANCELLED | OUTPATIENT
Start: 2023-03-31

## 2023-03-31 RX ORDER — HYDROCODONE BITARTRATE AND ACETAMINOPHEN 5; 325 MG/1; MG/1
1 TABLET ORAL EVERY 6 HOURS PRN
Qty: 8 TABLET | Refills: 0 | Status: SHIPPED | OUTPATIENT
Start: 2023-03-31

## 2023-03-31 RX ORDER — DEXAMETHASONE SODIUM PHOSPHATE 4 MG/ML
VIAL (ML) INJECTION AS NEEDED
Status: DISCONTINUED | OUTPATIENT
Start: 2023-03-31 | End: 2023-03-31 | Stop reason: SURG

## 2023-03-31 RX ORDER — HYDROMORPHONE HYDROCHLORIDE 1 MG/ML
0.2 INJECTION, SOLUTION INTRAMUSCULAR; INTRAVENOUS; SUBCUTANEOUS EVERY 5 MIN PRN
Status: DISCONTINUED | OUTPATIENT
Start: 2023-03-31 | End: 2023-03-31 | Stop reason: HOSPADM

## 2023-03-31 RX ORDER — ROCURONIUM BROMIDE 10 MG/ML
INJECTION, SOLUTION INTRAVENOUS AS NEEDED
Status: DISCONTINUED | OUTPATIENT
Start: 2023-03-31 | End: 2023-03-31 | Stop reason: SURG

## 2023-03-31 RX ORDER — LABETALOL HYDROCHLORIDE 5 MG/ML
5 INJECTION, SOLUTION INTRAVENOUS EVERY 5 MIN PRN
Status: CANCELLED | OUTPATIENT
Start: 2023-03-31 | End: 2023-03-31

## 2023-03-31 RX ORDER — NALOXONE HYDROCHLORIDE 0.4 MG/ML
80 INJECTION, SOLUTION INTRAMUSCULAR; INTRAVENOUS; SUBCUTANEOUS AS NEEDED
Status: CANCELLED | OUTPATIENT
Start: 2023-03-31 | End: 2023-03-31

## 2023-03-31 RX ORDER — SODIUM CHLORIDE, SODIUM LACTATE, POTASSIUM CHLORIDE, CALCIUM CHLORIDE 600; 310; 30; 20 MG/100ML; MG/100ML; MG/100ML; MG/100ML
INJECTION, SOLUTION INTRAVENOUS CONTINUOUS
Status: DISCONTINUED | OUTPATIENT
Start: 2023-03-31 | End: 2023-03-31 | Stop reason: HOSPADM

## 2023-03-31 RX ORDER — ONDANSETRON 2 MG/ML
4 INJECTION INTRAMUSCULAR; INTRAVENOUS EVERY 6 HOURS PRN
Status: DISCONTINUED | OUTPATIENT
Start: 2023-03-31 | End: 2023-03-31

## 2023-03-31 RX ORDER — HYDROCODONE BITARTRATE AND ACETAMINOPHEN 5; 325 MG/1; MG/1
2 TABLET ORAL ONCE AS NEEDED
Status: CANCELLED | OUTPATIENT
Start: 2023-03-31 | End: 2023-03-31

## 2023-03-31 RX ORDER — POLYETHYLENE GLYCOL 3350 17 G/17G
17 POWDER, FOR SOLUTION ORAL DAILY PRN
Status: DISCONTINUED | OUTPATIENT
Start: 2023-03-31 | End: 2023-03-31

## 2023-03-31 RX ORDER — SODIUM PHOSPHATE, DIBASIC AND SODIUM PHOSPHATE, MONOBASIC 7; 19 G/133ML; G/133ML
1 ENEMA RECTAL ONCE AS NEEDED
Status: DISCONTINUED | OUTPATIENT
Start: 2023-03-31 | End: 2023-03-31

## 2023-03-31 RX ORDER — CLONAZEPAM 1 MG/1
0.5 TABLET ORAL AS NEEDED
Status: DISCONTINUED | OUTPATIENT
Start: 2023-03-31 | End: 2023-03-31

## 2023-03-31 RX ORDER — MELATONIN
3 NIGHTLY PRN
Status: DISCONTINUED | OUTPATIENT
Start: 2023-03-31 | End: 2023-03-31

## 2023-03-31 RX ORDER — HYDROMORPHONE HYDROCHLORIDE 1 MG/ML
0.6 INJECTION, SOLUTION INTRAMUSCULAR; INTRAVENOUS; SUBCUTANEOUS EVERY 5 MIN PRN
Status: CANCELLED | OUTPATIENT
Start: 2023-03-31 | End: 2023-03-31

## 2023-03-31 RX ORDER — ACETAMINOPHEN 325 MG/1
650 TABLET ORAL ONCE
Status: CANCELLED | OUTPATIENT
Start: 2023-03-31 | End: 2023-03-31

## 2023-03-31 RX ORDER — ACETAMINOPHEN 500 MG
1000 TABLET ORAL ONCE AS NEEDED
Status: DISCONTINUED | OUTPATIENT
Start: 2023-03-31 | End: 2023-03-31 | Stop reason: HOSPADM

## 2023-03-31 RX ORDER — SENNOSIDES 8.6 MG
17.2 TABLET ORAL NIGHTLY PRN
Status: DISCONTINUED | OUTPATIENT
Start: 2023-03-31 | End: 2023-03-31

## 2023-03-31 RX ORDER — HYDROMORPHONE HYDROCHLORIDE 1 MG/ML
0.4 INJECTION, SOLUTION INTRAMUSCULAR; INTRAVENOUS; SUBCUTANEOUS EVERY 5 MIN PRN
Status: CANCELLED | OUTPATIENT
Start: 2023-03-31 | End: 2023-03-31

## 2023-03-31 RX ORDER — CEFOXITIN 2 G/1
INJECTION, POWDER, FOR SOLUTION INTRAVENOUS AS NEEDED
Status: DISCONTINUED | OUTPATIENT
Start: 2023-03-31 | End: 2023-03-31 | Stop reason: SURG

## 2023-03-31 RX ORDER — MORPHINE SULFATE 4 MG/ML
4 INJECTION, SOLUTION INTRAMUSCULAR; INTRAVENOUS EVERY 2 HOUR PRN
Status: DISCONTINUED | OUTPATIENT
Start: 2023-03-31 | End: 2023-03-31

## 2023-03-31 RX ORDER — HYDROMORPHONE HYDROCHLORIDE 1 MG/ML
0.6 INJECTION, SOLUTION INTRAMUSCULAR; INTRAVENOUS; SUBCUTANEOUS EVERY 5 MIN PRN
Status: DISCONTINUED | OUTPATIENT
Start: 2023-03-31 | End: 2023-03-31 | Stop reason: HOSPADM

## 2023-03-31 RX ORDER — ONDANSETRON 2 MG/ML
4 INJECTION INTRAMUSCULAR; INTRAVENOUS EVERY 6 HOURS PRN
Status: CANCELLED | OUTPATIENT
Start: 2023-03-31

## 2023-03-31 RX ORDER — NEOSTIGMINE METHYLSULFATE 1 MG/ML
INJECTION, SOLUTION INTRAVENOUS AS NEEDED
Status: DISCONTINUED | OUTPATIENT
Start: 2023-03-31 | End: 2023-03-31 | Stop reason: SURG

## 2023-03-31 RX ORDER — MORPHINE SULFATE 2 MG/ML
2 INJECTION, SOLUTION INTRAMUSCULAR; INTRAVENOUS EVERY 2 HOUR PRN
Status: DISCONTINUED | OUTPATIENT
Start: 2023-03-31 | End: 2023-03-31

## 2023-03-31 RX ADMIN — CEFOXITIN 2 G: 2 INJECTION, POWDER, FOR SOLUTION INTRAVENOUS at 14:38:00

## 2023-03-31 RX ADMIN — NEOSTIGMINE METHYLSULFATE 3 MG: 1 INJECTION, SOLUTION INTRAVENOUS at 15:23:00

## 2023-03-31 RX ADMIN — LIDOCAINE HYDROCHLORIDE 50 MG: 10 INJECTION, SOLUTION EPIDURAL; INFILTRATION; INTRACAUDAL; PERINEURAL at 14:30:00

## 2023-03-31 RX ADMIN — ONDANSETRON 4 MG: 2 INJECTION INTRAMUSCULAR; INTRAVENOUS at 14:45:00

## 2023-03-31 RX ADMIN — SODIUM CHLORIDE, SODIUM LACTATE, POTASSIUM CHLORIDE, CALCIUM CHLORIDE: 600; 310; 30; 20 INJECTION, SOLUTION INTRAVENOUS at 14:24:00

## 2023-03-31 RX ADMIN — ROCURONIUM BROMIDE 5 MG: 10 INJECTION, SOLUTION INTRAVENOUS at 14:57:00

## 2023-03-31 RX ADMIN — SODIUM CHLORIDE, SODIUM LACTATE, POTASSIUM CHLORIDE, CALCIUM CHLORIDE: 600; 310; 30; 20 INJECTION, SOLUTION INTRAVENOUS at 14:36:00

## 2023-03-31 RX ADMIN — KETOROLAC TROMETHAMINE 30 MG: 30 INJECTION, SOLUTION INTRAMUSCULAR; INTRAVENOUS at 15:23:00

## 2023-03-31 RX ADMIN — MIDAZOLAM HYDROCHLORIDE 2 MG: 1 INJECTION INTRAMUSCULAR; INTRAVENOUS at 14:24:00

## 2023-03-31 RX ADMIN — ROCURONIUM BROMIDE 15 MG: 10 INJECTION, SOLUTION INTRAVENOUS at 14:44:00

## 2023-03-31 RX ADMIN — DEXAMETHASONE SODIUM PHOSPHATE 8 MG: 4 MG/ML VIAL (ML) INJECTION at 14:41:00

## 2023-03-31 RX ADMIN — ROCURONIUM BROMIDE 5 MG: 10 INJECTION, SOLUTION INTRAVENOUS at 14:29:00

## 2023-03-31 RX ADMIN — SODIUM CHLORIDE, SODIUM LACTATE, POTASSIUM CHLORIDE, CALCIUM CHLORIDE: 600; 310; 30; 20 INJECTION, SOLUTION INTRAVENOUS at 15:47:00

## 2023-03-31 RX ADMIN — GLYCOPYRROLATE 0.2 MG: 0.2 INJECTION, SOLUTION INTRAMUSCULAR; INTRAVENOUS at 15:23:00

## 2023-03-31 NOTE — PLAN OF CARE
Pt alert and oriented X4. VSS. No co of pain, slightly distended abdomen. Pt went down in the afternoon for appendectomy. Surgeon came up to room to explain procedure to patient. Consents signed. Vanco given per MAR. IVF infusing, IVABX given per MAR. Family at bedside. Call light in reach, call light in place. 1630: Pt returned from surgery, vitals stable. Pt says pain is tolerable, explained plan of possible discharge if diet is tolerated, along with voiding. Resting comfortably, family at bedside. Will give pt last dose of antibiotics IV prior to discharge    1730: Pt got up walked around, voided, and ate. Tolerating liquids well, no complaints of pain. Will provide discharge paperwork. '    1800: Removed pt IV, provided paperwork, all questions answered. Pt belongings taken by her and family. NURSING DISCHARGE NOTE    Discharged Home via Wheelchair. Accompanied by Family member  Belongings Taken by patient/family.     Problem: PAIN - ADULT  Goal: Verbalizes/displays adequate comfort level or patient's stated pain goal  Description: INTERVENTIONS:  - Encourage pt to monitor pain and request assistance  - Assess pain using appropriate pain scale  - Administer analgesics based on type and severity of pain and evaluate response  - Implement non-pharmacological measures as appropriate and evaluate response  - Consider cultural and social influences on pain and pain management  - Manage/alleviate anxiety  - Utilize distraction and/or relaxation techniques  - Monitor for opioid side effects  - Notify MD/LIP if interventions unsuccessful or patient reports new pain  - Anticipate increased pain with activity and pre-medicate as appropriate  Outcome: Progressing

## 2023-03-31 NOTE — ED QUICK NOTES
Orders for admission, patient is aware of plan and ready to go upstairs.  Any questions, please call ED RN Dragan Willis at extension [phone]     Patient Covid vaccination status: Partially vaccinated     COVID Test Ordered in ED: Rapid SARS-CoV-2 by PCR    COVID Suspicion at Admission: Low clinical suspicion for COVID    Running Infusions:      Mental Status/LOC at time of transport: A/ox4    Other pertinent information:   CIWA score: N/A   NIH score:  N/A

## 2023-03-31 NOTE — ANESTHESIA PROCEDURE NOTES
Airway  Date/Time: 3/31/2023 2:34 PM  Urgency: elective      General Information and Staff    Patient location during procedure: OR  Anesthesiologist: Eduin Vargas MD  Resident/CRNA: Valentin Nicole CRNA  Performed: CRNA   Performed by: Valentin Nicole CRNA  Authorized by:  Eduin Vargas MD      Indications and Patient Condition  Indications for airway management: anesthesia  Sedation level: deep  Preoxygenated: yes  Patient position: sniffing  Mask difficulty assessment: 1 - vent by mask    Final Airway Details  Final airway type: endotracheal airway      Successful airway: ETT  Cuffed: yes   Successful intubation technique: direct laryngoscopy  Endotracheal tube insertion site: oral  Blade: GlideScope  Blade size: #3  ETT size (mm): 7.0    Cormack-Lehane Classification: grade I - full view of glottis  Placement verified by: chest auscultation and capnometry   Measured from: lips  ETT to lips (cm): 21  Number of attempts at approach: 1  Number of other approaches attempted: 1    Other Attempts  Unsuccessful attempted airways: endotracheal tube  Unsuccessful attempted endotracheal techniques: direct laryngoscopy    Additional Comments  Initial Attempt- MAC 3-Grade 3 View

## 2023-03-31 NOTE — ED INITIAL ASSESSMENT (HPI)
Pt to the ED for evaluation of RLQ abdominal pain. Sent from [address] [place] [address] for further evaluation. PT was seen in ED yesterday for generalized abd pain and had lab work done. Told to return for scan if pain localized to RLQ.

## 2023-03-31 NOTE — ANESTHESIA POSTPROCEDURE EVALUATION
[address] Patient Status:  Observation   Age/Gender 23year old female MRN [id number]   [facility] SURGERY Attending Sydney Guerra MD   [facility] Day # 0 PCP None Pcp       Anesthesia Post-op Note    LAPAROSCOPIC APPENDECTOMY    Procedure Summary     Date: 03/31/23 Room / Location: [facility] OR 08 / 140[facility] OR    Anesthesia Start: 2540 Anesthesia Stop: 6272    Procedure: LAPAROSCOPIC APPENDECTOMY (Abdomen) Diagnosis:       Appendicitis      (Appendicitis Kathalene Apa)    Surgeons: Melvin Tomlinson MD Anesthesiologist: Vannesa Perez MD    Anesthesia Type: general ASA Status: 2 - Emergent          Anesthesia Type: general    Vitals Value Taken Time   /88 03/31/23 1553   Temp 98.5 03/31/23 1553   Pulse 98 03/31/23 1553   Resp 20 03/31/23 1553   SpO2 98% 03/31/23 1553       Patient Location: PACU    Anesthesia Type: general    Airway Patency: patent    Postop Pain Control: adequate    Mental Status: mildly sedated but able to meaningfully participate in the post-anesthesia evaluation    Nausea/Vomiting: none    Cardiopulmonary/Hydration status: stable euvolemic    Complications: no apparent anesthesia related complications    Postop vital signs: stable    Dental Exam: Unchanged from Preop    Patient to be discharged from PACU when criteria met.

## 2023-04-03 ENCOUNTER — TELEPHONE (OUTPATIENT)
Facility: LOCATION | Age: 23
End: 2023-04-03

## 2023-04-03 ENCOUNTER — PATIENT OUTREACH (OUTPATIENT)
Dept: CASE MANAGEMENT | Age: 23
End: 2023-04-03

## 2023-04-03 NOTE — TELEPHONE ENCOUNTER
Pt had surgery 3/31 LAPAROSCOPIC APPENDECTOMY. Pt states her abdominal area is red and itchy. Pt denies bleeding, pain and fever. Appt made 4/14 post op.     Please advise  Call back [phone]

## 2023-04-03 NOTE — TELEPHONE ENCOUNTER
S/w pt, she states she has redness/rash like to lower abd, side, back and upper thigh. Denies s/s of infection. She states she has taken norco before with no reactions. She states she has been wearing the mesh underwear from post op. Advised her to shower, take benadryl prn for itching and irritations maybe coming from the underwear. .  Reviewed with her to call office if worsening of symptoms.   Pt verbalized understanding

## 2023-04-05 ENCOUNTER — OFFICE VISIT (OUTPATIENT)
Facility: LOCATION | Age: 23
End: 2023-04-05

## 2023-04-05 VITALS — TEMPERATURE: 99 F | HEART RATE: 107 BPM

## 2023-04-05 DIAGNOSIS — B37.31 VAGINAL CANDIDIASIS: ICD-10-CM

## 2023-04-05 DIAGNOSIS — K35.80 ACUTE APPENDICITIS, UNSPECIFIED ACUTE APPENDICITIS TYPE: ICD-10-CM

## 2023-04-05 DIAGNOSIS — Z90.49 S/P LAPAROSCOPIC APPENDECTOMY: ICD-10-CM

## 2023-04-05 DIAGNOSIS — Z98.890 POST-OPERATIVE STATE: ICD-10-CM

## 2023-04-05 DIAGNOSIS — R21 RASH: ICD-10-CM

## 2023-04-05 PROCEDURE — 99024 POSTOP FOLLOW-UP VISIT: CPT | Performed by: PHYSICIAN ASSISTANT

## 2023-04-05 RX ORDER — FLUCONAZOLE 150 MG/1
150 TABLET ORAL
Qty: 2 TABLET | Refills: 0 | Status: SHIPPED | OUTPATIENT
Start: 2023-04-05 | End: 2023-04-05

## 2023-04-07 ENCOUNTER — TELEPHONE (OUTPATIENT)
Facility: LOCATION | Age: 23
End: 2023-04-07

## 2023-04-07 RX ORDER — METHYLPREDNISOLONE 4 MG/1
TABLET ORAL
Qty: 21 EACH | Refills: 0 | Status: SHIPPED | OUTPATIENT
Start: 2023-04-07

## 2023-04-11 ENCOUNTER — OFFICE VISIT (OUTPATIENT)
Facility: LOCATION | Age: 23
End: 2023-04-11

## 2023-04-11 VITALS — TEMPERATURE: 99 F | HEART RATE: 75 BPM

## 2023-04-11 DIAGNOSIS — L08.9 SUPERFICIAL SKIN INFECTION: ICD-10-CM

## 2023-04-11 DIAGNOSIS — Z90.49 STATUS POST LAPAROSCOPIC APPENDECTOMY: Primary | ICD-10-CM

## 2023-04-11 DIAGNOSIS — Z98.890 POST-OPERATIVE STATE: ICD-10-CM

## 2023-04-11 PROBLEM — K35.80 ACUTE APPENDICITIS, UNSPECIFIED ACUTE APPENDICITIS TYPE: Status: RESOLVED | Noted: 2023-03-31 | Resolved: 2023-04-11

## 2023-04-11 PROBLEM — K37 APPENDICITIS: Status: RESOLVED | Noted: 2023-03-31 | Resolved: 2023-04-11

## 2023-04-11 PROCEDURE — 99024 POSTOP FOLLOW-UP VISIT: CPT

## 2023-04-11 RX ORDER — AMOXICILLIN AND CLAVULANATE POTASSIUM 875; 125 MG/1; MG/1
1 TABLET, FILM COATED ORAL 2 TIMES DAILY
Qty: 14 TABLET | Refills: 0 | Status: SHIPPED | OUTPATIENT
Start: 2023-04-11 | End: 2023-04-18

## 2023-04-14 ENCOUNTER — OFFICE VISIT (OUTPATIENT)
Facility: LOCATION | Age: 23
End: 2023-04-14

## 2023-04-14 VITALS — TEMPERATURE: 99 F | HEART RATE: 78 BPM

## 2023-04-14 DIAGNOSIS — Z98.890 POSTOPERATIVE STATE: Primary | ICD-10-CM

## 2023-04-18 ENCOUNTER — TELEPHONE (OUTPATIENT)
Facility: LOCATION | Age: 23
End: 2023-04-18

## 2023-04-18 NOTE — TELEPHONE ENCOUNTER
Per Mikki Avila, patient left voicemail message  thatshe  see needs a return to work  note. A return to work note was written by Dr Josefina Chung yesterday 4/17/23. I called patient to see if the note was sufficient or did it need to have different instructions,I left message for her to call back and left us know.

## 2023-04-21 ENCOUNTER — OFFICE VISIT (OUTPATIENT)
Facility: LOCATION | Age: 23
End: 2023-04-21

## 2023-04-21 VITALS — HEART RATE: 77 BPM | TEMPERATURE: 97 F

## 2023-04-21 DIAGNOSIS — Z98.890 POST-OPERATIVE STATE: Primary | ICD-10-CM

## 2023-04-21 PROCEDURE — 99024 POSTOP FOLLOW-UP VISIT: CPT

## 2023-07-05 ENCOUNTER — HOSPITAL ENCOUNTER (OUTPATIENT)
Dept: MRI IMAGING | Age: 23
Discharge: HOME | End: 2023-07-05
Attending: INTERNAL MEDICINE
Payer: COMMERCIAL

## 2023-07-05 DIAGNOSIS — K83.01 PSC (PRIMARY SCLEROSING CHOLANGITIS): ICD-10-CM

## 2023-07-05 PROCEDURE — A9575 INJ GADOTERATE MEGLUMI 0.1ML: HCPCS | Performed by: RADIOLOGY

## 2023-07-05 PROCEDURE — 76376 3D RENDER W/INTRP POSTPROCES: CPT | Performed by: INTERNAL MEDICINE

## 2023-07-05 PROCEDURE — 74183 MRI ABD W/O CNTR FLWD CNTR: CPT | Performed by: INTERNAL MEDICINE

## 2023-07-05 RX ORDER — GADOTERATE MEGLUMINE 376.9 MG/ML
15 INJECTION INTRAVENOUS
Status: COMPLETED | OUTPATIENT
Start: 2023-07-05 | End: 2023-07-05

## 2023-07-05 RX ADMIN — GADOTERATE MEGLUMINE 11 ML: 376.9 INJECTION INTRAVENOUS at 08:42:00

## 2024-02-06 NOTE — ED QUICK NOTES
MD at bedside.   The patient's goals for the shift include have a baby    The clinical goals for the shift include labor management      Problem: Vaginal Birth or  Section  Goal: Fetal and maternal status remain reassuring during the birth process  Outcome: Progressing  Goal: Tolerate CRB for IOL placement maintenance until dislodgement/removal 12hrs after placement  Outcome: Progressing  Goal: Prevention of malpresentation/labor dystocia through delivery  Outcome: Progressing  Goal: Demonstrates labor coping techniques through delivery  Outcome: Progressing  Goal: Minimal s/sx of HDP and BP<160/110  Outcome: Progressing  Goal: No s/sx of infection through recovery  Outcome: Progressing  Goal: No s/sx of hemorrhage through recovery  Outcome: Progressing     Problem: Pain - Adult  Goal: Verbalizes/displays adequate comfort level or baseline comfort level  Outcome: Progressing     Problem: Safety - Adult  Goal: Free from fall injury  Outcome: Progressing     Problem: Discharge Planning  Goal: Discharge to home or other facility with appropriate resources  Outcome: Progressing

## 2024-02-06 NOTE — H[facility]P
[facility]     Jackie Monte Patient Status:  Emergency    2000 MRN [id number]   Location [facility] EMERGENCY DEPARTMENT Attending Leila Brothers MD   Hosp Day # 0 PCP None Pcp     Chief Complaint: Abdominal pain     Subjective:    History of Present Illness:     Jackie Monte is a 23 year old female with ulcerative colitis, primary sclerosing cholangitis and anxiety who presents with abdominal pain. Patient was well until this weekend when she began to have upper abdominal pain radiating to her back. Associated was nausea and vomiting. No diarrhea. No fever. Patient groggy after analgesics and history a bit limited. Pain is different than past episodes.    She continued on Vanco.    History/Other:    Past Medical History:  Past Medical History:   Diagnosis Date    Anxiety     Anxiety state, unspecified     Asthma     Eczema     Extrinsic asthma, unspecified     exercise induded asthma    Food allergy, peanut     Migraines     Nausea and vomiting in adult 2024    PSC (primary sclerosing cholangitis)     today 17-asymptomatic    Ulcerative colitis (HCC)      Past Surgical History:   Past Surgical History:   Procedure Laterality Date    COLONOSCOPY  2013    Procedure: COLONOSCOPY;  Surgeon: John Dale MD;  Location:  ENDOSCOPY    COLONOSCOPY N/A 06/15/2017    Procedure: COLONOSCOPY;  Surgeon: John Dale MD;  Location:  ENDOSCOPY    ERCP,DIAGNOSTIC      LAPAROSCOPIC APPENDECTOMY  2023      Family History:   Family History   Problem Relation Age of Onset    Hypertension Father     Hypertension Paternal Grandfather     Cancer Paternal Grandfather     Cancer Maternal Grandmother      Social History:    reports that she has never smoked. She has never been exposed to tobacco smoke. She has never used smokeless tobacco. She reports that she does not drink alcohol and does not use drugs.     Allergies:   Allergies   Allergen Reactions    Nuts  ANAPHYLAXIS     All tree nuts    Peanuts ANAPHYLAXIS       Medications:    No current facility-administered medications on file prior to encounter.     Current Outpatient Medications on File Prior to Encounter   Medication Sig Dispense Refill    sertraline 100 MG Oral Tab Take 1 tablet (100 mg total) by mouth daily.      Rizatriptan Benzoate 10 MG Oral Tab Take 1 tablet (10 mg total) by mouth as needed for Migraine.      adalimumab 80 MG/0.8ML Subcutaneous Pen-injector Kit Inject 160 mg into the skin once.      vancomycin 125 MG Oral Cap Take 1 capsule (125 mg total) by mouth 4 (four) times daily.      butalbital-aspirin-caffeine -40 MG Oral Cap Take 1 capsule by mouth every 4 (four) hours as needed for Pain.      ondansetron 4 MG Oral Tablet Dispersible Take 1 tablet (4 mg total) by mouth every 8 (eight) hours as needed for Nausea.      Budesonide 9 MG Oral Tablet 24 Hr Take 9 mg by mouth daily.      Albuterol Sulfate HFA (VENTOLIN) 108 (90 BASE) MCG/ACT Inhalation Aero Soln Inhale 2 puffs into the lungs every 6 (six) hours as needed.      Potassium Chloride ER 20 MEQ Oral Tab CR Take 1 tablet (20 mEq total) by mouth 2 (two) times daily. (Patient not taking: No sig reported) 14 tablet 0    clonazePAM 0.5 MG Oral Tab Take 1 tablet (0.5 mg total) by mouth as needed.  0    ergocalciferol 89999 units Oral Cap Take 1 capsule (50,000 Units total) by mouth once a week. (Patient not taking: Reported on 2/6/2024)      Vedolizumab 300 MG Intravenous Recon Soln Inject 300 mg into the vein. (Patient not taking: No sig reported)      budesonide 0.5 MG/2ML Inhalation Suspension Take 0.5 mg by nebulization as needed. (Patient not taking: Reported on 2/6/2024)      DiphenhydrAMINE HCl (BENADRYL) 12.5 MG/5ML Oral Elixir Take  by mouth 4 (four) times daily as needed for Allergies.      desloratadine 5 MG Oral Tab Take 5 mg by mouth daily. (Patient not taking: Reported on 12/27/2022)         Review of Systems:   A comprehensive  review of systems was completed.    Pertinent positives and negatives noted in the HPI.    Objective:   Physical Exam:    /78 (BP Location: Left arm)   Pulse 88   Temp 98.8 °F (37.1 °C) (Oral)   Resp 18   Ht 5' 7\" (1.702 m)   Wt 130 lb 9.6 oz (59.2 kg)   LMP 01/06/2024 (Approximate)   SpO2 100%   BMI 20.45 kg/m²   General: No acute distress,   Respiratory: No rhonchi, no wheezes  Cardiovascular: S1, S2. Regular rate and rhythm  Abdomen: Soft, tender, non-distended, positive bowel sounds  Neuro: No new focal deficits  Extremities: No edema    Results:    Labs:      Labs Last 24 Hours:    Recent Labs   Lab 02/06/24  1106   RBC 4.43   HGB 14.1   HCT 39.1   MCV 88.3   MCH 31.8   MCHC 36.1   RDW 12.0   NEPRELIM 5.58   WBC 9.5   .0       Recent Labs   Lab 02/06/24  1106   *   BUN 12   CREATSERUM 0.66   EGFRCR 125   CA 9.4   ALB 3.5      K 2.9*      CO2 19.0*   ALKPHO 160*   AST 41*   *   BILT 0.6   TP 7.9       Lab Results   Component Value Date    INR 1.01 03/31/2023       No results for input(s): \"TROP\", \"TROPHS\", \"CK\" in the last 168 hours.    No results for input(s): \"TROP\", \"PBNP\" in the last 168 hours.    No results for input(s): \"PCT\" in the last 168 hours.    Imaging: Imaging data reviewed in Epic.    Assessment & Plan:      #Abdominal pain, possible early acute cholecystitis  #Transaminitis  #Ulcerative colitis  #Primary sclerosing cholangitis  #Anxiety  #History of Cdiff  #Hypokalemia      Plan:  Admit  NPO  IVF  IV abx  Vanco - chronic  Analgesics and antiemetics as needed  MRCP  Monitor LFTs  Isolation   Surgery & GI consult    DVT Px: Lovenox     Plan of care discussed with patient, , ER and surgical team.     Thom Mas MD    Supplementary Documentation:     The 21st Century Cures Act makes medical notes like these available to patients in the interest of transparency. Please be advised this is a medical document. Medical documents are intended to  carry relevant information, facts as evident, and the clinical opinion of the practitioner. The medical note is intended as peer to peer communication and may appear blunt or direct. It is written in medical language and may contain abbreviations or verbiage that are unfamiliar.

## 2024-02-06 NOTE — CONSULTS
[facility]  Report of  Surgical Consultation with History and Physical Exam    Jackie Monte Patient Status:  Inpatient    2000 MRN [id number]   Location [facility] 5NW-A Attending Thom Mas MD   Hosp Day # 0 PCP None Pcp     Reason for Surgical Consultation:  I am seeing this patient at the request of Dr. Brothers for epigastric abdominal pain.    History of Present Illness:  Jackie Monte is a a(n) 23 year old female who presented to the ER earlier this morning with worsening epigastric abdominal pain.  The patient states the pain began on .  She states yesterday she ate a \"bland\" diet to help alleviate her symptoms.  She states she had some toast and a banana.  She states her pain is associated with nausea and vomiting.  The pain continued to worsen today, so she presented to the emergency department.    Upon presentation to the emergency department, abdominal ultrasound revealed a distended gallbladder with sludge and stones.  There is gallbladder wall thickening.  There is possible early acute or chronic cholecystitis.  A HIDA scan was recommended.  The common bile duct is at the upper limit of normal measuring up to 7 mm in diameter.  When comparing ultrasound to MRCP of the abdomen from 2023, the radiologist notes a distended gallbladder at that time.    The patient's vital signs are stable.  She is afebrile.  WBCs are 9.5.  Hemoglobin 14.1.  Platelets 285,000.  Patient is within normal limits at 36.  Hypokalemic at 2.9.  AST, ALT and alkaline phosphatase elevated to 41, 118 and 160 respectively.  Total bilirubin is within normal limits at 0.6.    The patient has a fairly complex past medical history.  The patient has a past medical history significant for ulcerative colitis and primary sclerosing cholangitis (pancolitis).  She follows with Dr. Arredondo at [facility] as her gastroenterologist and Dr. Pan at [place] as her hepatologist.  She states if she requires further  management, she would prefer to be transferred to [facility] as they are aware of her complex medical history.  Additionally, she has a past medical history significant for C. difficile colitis and asthma.  She receives Humira weekly.    Her past surgical history is significant for laparoscopic appendectomy with Dr. Wells in March 2023.            History:  Past Medical History:   Diagnosis Date    Anxiety     Anxiety state, unspecified     Asthma     Eczema     Extrinsic asthma, unspecified     exercise induded asthma    Food allergy, peanut     Migraines     Nausea and vomiting in adult 2/6/2024    PSC (primary sclerosing cholangitis)     today 06/14/17-asymptomatic    Ulcerative colitis (HCC)      Past Surgical History:   Procedure Laterality Date    COLONOSCOPY  08/14/2013    Procedure: COLONOSCOPY;  Surgeon: John Dale MD;  Location:  ENDOSCOPY    COLONOSCOPY N/A 06/15/2017    Procedure: COLONOSCOPY;  Surgeon: John Dale MD;  Location:  ENDOSCOPY    ERCP,DIAGNOSTIC      LAPAROSCOPIC APPENDECTOMY  03/31/2023     Family History   Problem Relation Age of Onset    Hypertension Father     Hypertension Paternal Grandfather     Cancer Paternal Grandfather     Cancer Maternal Grandmother       reports that she has never smoked. She has never been exposed to tobacco smoke. She has never used smokeless tobacco. She reports that she does not drink alcohol and does not use drugs.    Allergies:  Allergies   Allergen Reactions    Nuts ANAPHYLAXIS     All tree nuts    Peanuts ANAPHYLAXIS       Medications:    Current Facility-Administered Medications:     sodium chloride 0.9% infusion, , Intravenous, Continuous    potassium chloride 40 mEq in 250mL sodium chloride 0.9% IVPB premix, 40 mEq, Intravenous, Once    clonazePAM (KlonoPIN) tab 0.5 mg, 0.5 mg, Oral, PRN    rizatriptan (MAXALT) tab, 10 mg, Oral, PRN    ampicillin-sulbactam (Unasyn) 3 g in sodium chloride 0.9% 100mL IVPB-ADD, 3 g, Intravenous, Q6H     potassium chloride 20 mEq in dextrose 5%-sodium chloride 0.45% 1000mL infusion premix, , Intravenous, Continuous    HYDROmorphone (Dilaudid) 1 MG/ML injection 0.2 mg, 0.2 mg, Intravenous, Q2H PRN **OR** HYDROmorphone (Dilaudid) 1 MG/ML injection 0.4 mg, 0.4 mg, Intravenous, Q2H PRN **OR** HYDROmorphone (Dilaudid) 1 MG/ML injection 0.8 mg, 0.8 mg, Intravenous, Q2H PRN    acetaminophen (Ofirmev) 10 mg/mL infusion premix 1,000 mg, 1,000 mg, Intravenous, Q6H PRN    ondansetron (Zofran) 4 MG/2ML injection 4 mg, 4 mg, Intravenous, Q6H PRN    prochlorperazine (Compazine) 10 MG/2ML injection 5 mg, 5 mg, Intravenous, Q8H PRN    butalbital-acetaminophen-caffeine (Fioricet) -40 MG per tab 1 tablet, 1 tablet, Oral, Q6H PRN    sertraline (Zoloft) tab 100 mg, 100 mg, Oral, Daily    diphenhydrAMINE (Benadryl) 12.5 MG/5ML oral elixir 12.5 mg, 12.5 mg, Oral, QID PRN    Budesonide TB24 9 mg, 9 mg, Oral, Daily    vancomycin (Vancocin) cap 125 mg, 125 mg, Oral, QID    Review of Systems:  Pertinent items are noted in HPI.  Allergic/Immuno:  Review of patient's allergies performed.  Cardiovascular:  Negative for cool extremity and irregular heartbeat/palpitations  Constitutional:  Negative for decreased activity, fever, irritability and lethargy  Endocrine:  Negative for abnormal sleep patterns, increased activity, polydipsia and polyphagia  ENMT:  Negative for ear drainage, hearing loss and nasal drainage  Eyes:  Negative for eye discharge and vision loss  Gastrointestinal:   Negative for abdominal pain, constipation, decreased appetite, diarrhea and vomiting  Genitourinary:  Negative for dysuria and hematuria  Hema/Lymph:  Negative for easy bleeding and easy bruising  Integumentary:  Negative for pruritus and rash  Musculoskeletal:  Negative for bone/joint symptoms  Neurological:  Negative for gait disturbance  Psychiatric:  Negative for inappropriate interaction and psychiatric symptoms  Respiratory:  Negative for cough, dyspnea  and wheezing      Physical Exam:  Blood pressure 122/86, pulse 89, temperature 98.3 °F (36.8 °C), temperature source Oral, resp. rate 16, height 67\", weight 130 lb 9.6 oz (59.2 kg), last menstrual period 01/06/2024, SpO2 100%.    General: Alert, orientated x3.  Cooperative.  No apparent distress.    HEENT: Exam is unremarkable.  Without scleral icterus.  Mucous membranes are moist. EOM are WNL.    Neck: No tenderness to palpitation.  Full range of motion to flexion and extension, lateral rotation and lateral flexion of cervical spine.  No JVD. Supple.     Lungs: No secondary use of accessory respiratory musculature.    Abdomen: Soft, not distended without tympany to percussion.  Tenderness to palpation in the epigastric region.  No tenderness to palpation in the right upper quadrant.  No rebound or guarding.  Negative Pagan sign.    Extremities:  No lower extremity edema noted.  Without clubbing or cyanosis.      Skin: Normal texture and turgor.      Laboratory Data and Relevant X-rays:  Recent Labs   Lab 02/06/24  1106   RBC 4.43   HGB 14.1   HCT 39.1   MCV 88.3   MCH 31.8   MCHC 36.1   RDW 12.0   NEPRELIM 5.58   WBC 9.5   .0       Recent Labs   Lab 02/06/24  1106   *   BUN 12   CREATSERUM 0.66   CA 9.4   ALB 3.5      K 2.9*      CO2 19.0*   ALKPHO 160*   AST 41*   *   BILT 0.6   TP 7.9         No results for input(s): \"PTP\", \"INR\", \"PTT\" in the last 168 hours.    Imaging    Narrative   PROCEDURE:  US ABDOMEN COMPLETE (CPT=76700)     COMPARISON:  [address] [facility], MR, MRI ABDOMEN&MRCP W/3D (W+W0)(CPT=74183/43162), 7/05/2023, 7:54 AM.     INDICATIONS:  hx PSC, epigastric and ruq pain, last stent 2020     TECHNIQUE:  Real time gray-scale ultrasound was used to evaluate the abdomen.  The exam includes images of the liver, gallbladder, common bile duct, pancreas, spleen, kidneys, IVC, and aorta.     FINDINGS:    LIVER:  Normal size and echogenicity. No significant masses.  BILIARY:  The  gallbladder is distended and contains sludge and stones.  There is gallbladder wall thickening measuring up to 5 mm in thickness.  Possibility of early acute or chronic cholecystitis are not entirely excluded.  Clinical correlation  recommended.  HIDA scan may be of further value.  The common bile duct is at the upper limits of normal measuring up to 7 mm in diameter with choledocholithiasis, stricture, or distal obstructing mass not entirely excluded.  Clinical correlation  recommended.  MRCP/ERCP may be of further value.  PANCREAS:  Tail obscured by bowel gas limiting evaluation, otherwise the visualized portions are unremarkable.  SPLEEN:  Unremarkable measuring up to 9.7 cm.  KIDNEYS:  No hydronephrosis.  Right kidney measures 10.1 cm.  Left kidney measures 10.8 cm.  AORTA/IVC:  Visualized portions are unremarkable.                   Impression   CONCLUSION:       1. The gallbladder is distended and contains sludge and stones.  There is gallbladder wall thickening measuring up to 5 mm in thickness.  Possibility of early acute or chronic cholecystitis are not entirely excluded.  Clinical correlation recommended.    HIDA scan may be of further value.       2. The common bile duct is at the upper limits of normal measuring up to 7 mm in diameter with choledocholithiasis, stricture, or distal obstructing mass not entirely excluded.  Clinical correlation recommended.  MRCP/ERCP may be of further value.     3. Limited evaluation of the pancreas due to partial obscuration by bowel gas.     Please see above for further details.     LOCATION:  [id number]           Dictated by (CST): Jude Bradley MD on 2/06/2024 at 1:13 PM      Finalized by (CST): Jude Bradley MD on 2/06/2024 at 1:15 PM       Angie Tellez PA-C  2/6/2024  5:05 PM    Is this a shared or split note between Advanced Practice Provider and Physician? Yes      Impression:  Patient Active Problem List   Diagnosis    Chest pain, non-cardiac     Tachycardia, paroxysmal (HCC)    Ulcerative colitis with complication, unspecified location (HCC)    PSC (primary sclerosing cholangitis)    Ulcerative colitis with complication (HCC)    Pleurisy    Acute cholecystitis    Ulcerative colitis (HCC)    Mild intermittent asthma without complication    Intractable abdominal pain    Nausea and vomiting in adult    Biliary colic    Ulcerative pancolitis without complication (HCC)    Hypokalemia    Metabolic acidosis       Jackie Monte is a a(n) 23 year old female who presented to the ER earlier this morning with worsening epigastric abdominal pain.  The patient states the pain began on Sunday.  She states yesterday she ate a \"bland\" diet to help alleviate her symptoms.  She states she had some toast and a banana.  She states her pain is associated with nausea and vomiting.  The pain continued to worsen today, so she presented to the emergency department.    Upon presentation to the emergency department, abdominal ultrasound revealed a distended gallbladder with sludge and stones.  There is gallbladder wall thickening.  There is possible early acute or chronic cholecystitis.  A HIDA scan was recommended.  The common bile duct is at the upper limit of normal measuring up to 7 mm in diameter.  When comparing ultrasound to MRCP of the abdomen from July 2023, the radiologist notes a distended gallbladder at that time.    The patient's vital signs are stable.  She is afebrile.  WBCs are 9.5.  Hemoglobin 14.1.  Platelets 285,000.  Patient is within normal limits at 36.  Hypokalemic at 2.9.  AST, ALT and alkaline phosphatase elevated to 41, 118 and 160 respectively.  Total bilirubin is within normal limits at 0.6.    The patient has a fairly complex past medical history.  The patient has a past medical history significant for ulcerative colitis and primary sclerosing cholangitis (pancolitis).  She follows with Dr. Arredondo at [facility] as her gastroenterologist and Dr. Pan at  [place] as her hepatologist.  She states if she requires further management, she would prefer to be transferred to [facility] as they are aware of her complex medical history.  Additionally, she has a past medical history significant for C. difficile colitis and asthma.  She receives Humira weekly.    Her past surgical history is significant for laparoscopic appendectomy performed by me in March 2023.  She appears generally well on exam.  She has epigastric tenderness.    Plan:  I discussed the case with Dr. Treviño (GI) and Dr. Mas (hospitalist). I agree with Dr. Treviño that transfer to a tertiary academic center is reasonable.  Patient has already established care with specialists at [facility] and [facility].  No plans for urgent/emergent cholecystectomy at this time.    In the meantime, I agree with further imaging evaluation of the biliary system and gallbladder with MRCP.  Please keep n.p.o. with IV fluids for now.  Continue pain and nausea medication as needed.  Okay for DVT prophylaxis.  Surgery will continue to follow, please contact with any further questions or concerns.    Aris Wells MD  [facility] General Surgery

## 2024-02-06 NOTE — ED PROVIDER NOTES
Patient Seen in: [facility] Emergency Department      History     Chief Complaint   Patient presents with    Abdomen/Flank Pain    Nausea/Vomiting/Diarrhea     Stated Complaint: Abdominal pain /vomiting    Subjective:   HPI    23-year-old female presents to the emergency department with complaints of epigastric pain and persistent vomiting.  Patient states that she tried to take a Norco earlier because the pain was getting severe she is unsure if she kept it down or not.  On further discussion she states that she has had a little bit of pain initially on Sunday.  She states that yesterday things were okay but she did not feel quite right she only had Cheerios for dinner.  She states this morning things escalated and worsen.  No diarrhea.  Patient is a complex past medical history that includes history of ulcerative colitis as well as PSC.  She has had 2 stents placed with the most recent one being in 2020.  She states that this does not feel like her PSC pain.  She has not had any change in her stools to be consistent with ulcerative colitis.  No unusual foods.  No fevers or chills.  No recent travel.  No one else is ill.    Objective:   Past Medical History:   Diagnosis Date    Anxiety     Anxiety state, unspecified     Asthma     Eczema     Extrinsic asthma, unspecified     exercise induded asthma    Food allergy, peanut     Migraines     PSC (primary sclerosing cholangitis)     today 06/14/17-asymptomatic    Ulcerative colitis (HCC)               Past Surgical History:   Procedure Laterality Date    COLONOSCOPY  08/14/2013    Procedure: COLONOSCOPY;  Surgeon: John Dale MD;  Location:  ENDOSCOPY    COLONOSCOPY N/A 06/15/2017    Procedure: COLONOSCOPY;  Surgeon: John Dale MD;  Location:  ENDOSCOPY    ERCP,DIAGNOSTIC      LAPAROSCOPIC APPENDECTOMY  03/31/2023                Social History     Socioeconomic History    Marital status:    Tobacco Use    Smoking status: Never     Passive  exposure: Never    Smokeless tobacco: Never   Vaping Use    Vaping Use: Never used   Substance and Sexual Activity    Alcohol use: No    Drug use: No   Other Topics Concern    Caffeine Concern No    Exercise No              Review of Systems   All other systems reviewed and are negative.      Positive for stated complaint: Abdominal pain /vomiting  Other systems are as noted in HPI.  Constitutional and vital signs reviewed.      All other systems reviewed and negative except as noted above.    Physical Exam     ED Triage Vitals [02/06/24 1058]   /84   Pulse 76   Resp 20   Temp 97 °F (36.1 °C)   Temp src Temporal   SpO2 97 %   O2 Device None (Room air)       Current:/87   Pulse 69   Temp 97 °F (36.1 °C) (Temporal)   Resp 16   Ht 170.2 cm (5' 7\")   Wt 59 kg   LMP 01/06/2024 (Approximate)   SpO2 99%   BMI 20.36 kg/m²         Physical Exam  Vitals and nursing note reviewed.   Constitutional:       General: She is in acute distress.      Appearance: Normal appearance. She is well-developed. She is ill-appearing.      Comments: Actively vomiting appears uncomfortable tearful   HENT:      Head: Normocephalic and atraumatic.   Cardiovascular:      Rate and Rhythm: Normal rate and regular rhythm.      Pulses: Normal pulses.      Heart sounds: Normal heart sounds.   Pulmonary:      Effort: Pulmonary effort is normal.      Breath sounds: Normal breath sounds. No stridor. No wheezing.   Abdominal:      General: Bowel sounds are normal.      Palpations: Abdomen is soft.      Tenderness: There is abdominal tenderness. There is no rebound.      Comments: Mostly epigastric slight right upper quadrant no rebound   Musculoskeletal:         General: No tenderness. Normal range of motion.      Cervical back: Normal range of motion and neck supple.   Lymphadenopathy:      Cervical: No cervical adenopathy.   Skin:     General: Skin is warm and dry.      Capillary Refill: Capillary refill takes less than 2 seconds.       Coloration: Skin is not pale.   Neurological:      General: No focal deficit present.      Mental Status: She is alert and oriented to person, place, and time.      Cranial Nerves: No cranial nerve deficit.      Coordination: Coordination normal.              ED Course     Labs Reviewed   COMP METABOLIC PANEL (14) - Abnormal; Notable for the following components:       Result Value    Glucose 117 (*)     Potassium 2.9 (*)     CO2 19.0 (*)     AST 41 (*)      (*)     Alkaline Phosphatase 160 (*)     A/G Ratio 0.8 (*)     All other components within normal limits   LIPASE - Normal   LACTIC ACID, PLASMA - Normal   CBC WITH DIFFERENTIAL WITH PLATELET    Narrative:     The following orders were created for panel order CBC With Differential With Platelet.  Procedure                               Abnormality         Status                     ---------                               -----------         ------                     CBC W/ DIFFERENTIAL[[id number]]                              Final result                 Please view results for these tests on the individual orders.   URINALYSIS, ROUTINE   BLOOD CULTURE   BLOOD CULTURE   CBC W/ DIFFERENTIAL             US ABDOMEN COMPLETE (CPT=76700)    Result Date: 2/6/2024  PROCEDURE:  US ABDOMEN COMPLETE (CPT=76700)  COMPARISON:  [address] [facility], MR, MRI ABDOMEN&MRCP W/3D (W+W0)(CPT=74183/12719), 7/05/2023, 7:54 AM.  INDICATIONS:  hx PSC, epigastric and ruq pain, last stent 2020  TECHNIQUE:  Real time gray-scale ultrasound was used to evaluate the abdomen.  The exam includes images of the liver, gallbladder, common bile duct, pancreas, spleen, kidneys, IVC, and aorta.  FINDINGS:  LIVER:  Normal size and echogenicity. No significant masses. BILIARY:  The gallbladder is distended and contains sludge and stones.  There is gallbladder wall thickening measuring up to 5 mm in thickness.  Possibility of early acute or chronic cholecystitis are not entirely excluded.  Clinical  correlation recommended.  HIDA scan may be of further value.  The common bile duct is at the upper limits of normal measuring up to 7 mm in diameter with choledocholithiasis, stricture, or distal obstructing mass not entirely excluded.  Clinical correlation recommended.  MRCP/ERCP may be of further value. PANCREAS:  Tail obscured by bowel gas limiting evaluation, otherwise the visualized portions are unremarkable. SPLEEN:  Unremarkable measuring up to 9.7 cm. KIDNEYS:  No hydronephrosis.  Right kidney measures 10.1 cm.  Left kidney measures 10.8 cm. AORTA/IVC:  Visualized portions are unremarkable.            CONCLUSION:   1. The gallbladder is distended and contains sludge and stones.  There is gallbladder wall thickening measuring up to 5 mm in thickness.  Possibility of early acute or chronic cholecystitis are not entirely excluded.  Clinical correlation recommended.  HIDA scan may be of further value.   2. The common bile duct is at the upper limits of normal measuring up to 7 mm in diameter with choledocholithiasis, stricture, or distal obstructing mass not entirely excluded.  Clinical correlation recommended.  MRCP/ERCP may be of further value.  3. Limited evaluation of the pancreas due to partial obscuration by bowel gas.  Please see above for further details.  LOCATION:  [id number]    Dictated by (CST): Jude Bradely MD on 2/06/2024 at 1:13 PM     Finalized by (CST): Jude Bradley MD on 2/06/2024 at 1:15 PM              MDM      Patient had IV established and blood work obtained.  She is given IV fluid resuscitation.  She was given Reglan, Benadryl and Dilaudid to help with pain nausea and vomiting.  This helped her significantly.  She was still having some discomfort.  I was reviewing her records and confirm the above history.  Currently her blood work is similar to her baseline as far as her liver function test go she does not have an elevated bilirubin level or significantly elevated alk phos from her  baseline that would be concerning about an acute obstruction.  She does have a low potassium and low serum bicarbonate consistent with volume contraction and a metabolic acidosis from persistent emesis.  She underwent an ultrasound.  On ultrasound she was found to have gallstones and she had some thickening of her gallbladder wall it was unclear if this was acute or chronic.  Certainly this could be an etiology of her pain as well.  Currently she does not have a white count.  Nonetheless she will be covered for possible acute cholecystitis.  We started to replace her potassium.  She was reevaluated and is overall feeling better but does have some persistent epigastric discomfort but not as severe.  At this point feel that she is best served being further evaluated by specialist.  I contacted the [facility] Hospital[facility], surgery and gastroenterology.  Patient will be admitted for further care and treatment.  Admission disposition: 2/6/2024  2:29 PM                                        Medical Decision Making      Disposition and Plan     Clinical Impression:  1. Intractable abdominal pain    2. Nausea and vomiting in adult    3. Biliary colic    4. PSC (primary sclerosing cholangitis)    5. Ulcerative pancolitis without complication (HCC)    6. Hypokalemia    7. Metabolic acidosis         Disposition:  Admit  2/6/2024  2:29 pm    Follow-up:  No follow-up provider specified.        Medications Prescribed:  Current Discharge Medication List                            Hospital Problems       Present on Admission  Date Reviewed: 2/6/2024            ICD-10-CM Noted POA    * (Principal) Intractable abdominal pain R10.9 2/6/2024 Unknown

## 2024-02-06 NOTE — ED QUICK NOTES
Orders for admission, patient is aware of plan and ready to go upstairs. Any questions, please call ED RN Nelson at extension [phone].     Patient Covid vaccination status: Partially vaccinated     COVID Test Ordered in ED: None    COVID Suspicion at Admission: N/A    Running Infusions:  potassium chloride    Mental Status/LOC at time of transport: A/O x4    Other pertinent information:   CIWA score: N/A   NIH score:  N/A

## 2024-02-06 NOTE — ED QUICK NOTES
Rounding Completed    Plan of Care reviewed. Waiting for US abdomen results.  Elimination needs assessed.  Provided blanket    Bed is locked and in lowest position. Call light within reach.

## 2024-02-06 NOTE — CONSULTS
[facility]                       Gastroenterology Consultation-[facility] Gastroenterology    Jackie Monte Patient Status:  Emergency    2000 MRN [id number]   Location [facility] EMERGENCY DEPARTMENT Attending Leila Brothers MD   Hosp Day # 0 PCP None Pcp     Reason for consultation: acute abd pain; elevated LFTs  HPI: This is a 23 yr-old female with Pmhx that includes UC (dx ; pancolitis, followed at  Dr Cullen Evans currently on Humira weekly--last dose 7 days ago) c/b recurrent C Diff and PSC (dx ; followed at  + [facility]) who presents to ER today with severe upper abd pain which progressed to nausea. Pt reports being in her usual state of health until  when she developed severe, constant epigastric pain with radiation to RUQ. Yesterday symptoms progressed to nausea with non-bloody vomiting and anorexia. No diarrhea, melena, or hematochezia. No fevers or chills. No known sick contacts, dietary indiscretions, change in diet patterns, recent abx, or recent travel. No NSAIDs, herbals, or chronic anticoagulants. She reports that her pain is similar to prior episodes of biliary colic and does not seem to be c/w prior episodes of IBD flares or PSC. Most recent colonoscopy 2023 revealing pancolonic UC with variable mucosal healing and most recent MRCP 2023 suggesting tubular structure in the left hepatic lobe c/w dialted intrahepatic duct  US abd suggests distended gallbladder containing sludge +stones with CBD 7 mm; labs elevated LFTs (AST 41  Alk Phos 160 Bili 0.6), with normal lipase (36), normal kidney functions, hypokalemia (2.9), normal albumin, and normal CBC.   PMHx:   Past Medical History:   Diagnosis Date    Anxiety     Anxiety state, unspecified     Asthma     Eczema     Extrinsic asthma, unspecified     exercise induded asthma    Food allergy, peanut     Migraines     PSC (primary sclerosing cholangitis)     today 15-asymptomatic    Ulcerative colitis (HCC)                  PSHx:   Past Surgical History:   Procedure Laterality Date    COLONOSCOPY  08/14/2013    Procedure: COLONOSCOPY;  Surgeon: John Dale MD;  Location:  ENDOSCOPY    COLONOSCOPY N/A 06/15/2017    Procedure: COLONOSCOPY;  Surgeon: John Dale MD;  Location:  ENDOSCOPY    ERCP,DIAGNOSTIC      LAPAROSCOPIC APPENDECTOMY  03/31/2023     Medications:    [COMPLETED] metoclopramide (Reglan) 5 mg/mL injection 10 mg  10 mg Intravenous Once    [COMPLETED] diphenhydrAMINE (Benadryl) 50 mg/mL  injection 50 mg  50 mg Intravenous Once    HYDROmorphone (Dilaudid) 1 MG/ML injection 0.5 mg  0.5 mg Intravenous Q30 Min PRN    [COMPLETED] sodium chloride 0.9 % IV bolus 1,000 mL  1,000 mL Intravenous Once    [COMPLETED] potassium chloride 20 mEq/100mL IVPB premix 20 mEq  20 mEq Intravenous Once    ampicillin-sulbactam (Unasyn) 3 g in sodium chloride 0.9% 100mL IVPB-ADD  3 g Intravenous Once    potassium chloride 40 mEq in 250mL sodium chloride 0.9% IVPB premix  40 mEq Intravenous Once     Allergies:   Allergies   Allergen Reactions    Nuts ANAPHYLAXIS     All tree nuts    Peanuts ANAPHYLAXIS     Social HX:   Social History     Socioeconomic History    Marital status:    Tobacco Use    Smoking status: Never     Passive exposure: Never    Smokeless tobacco: Never   Vaping Use    Vaping Use: Never used   Substance and Sexual Activity    Alcohol use: No    Drug use: No   Other Topics Concern    Caffeine Concern No    Exercise No      FamHx: The patient has no family history of colon cancer or other gastrointestinal malignancies;  No family history of ulcer disease, or inflammatory bowel disease  ROS:  In addition to the pertinent positives described above:            Infectious Disease:  No chronic infections or recent fevers prior to the acute illness            Cardiovascular: No history of CAD, prior MI, chest pain, or palpitations            Respiratory: No shortness of breath, asthma, copd, recurrent  pneumonia            Hematologic: The patient reports no easy bruising, frequent gum bleeding or nose bleeding;  The patient has no history of known chronic anemia            Dermatologic: The patient reports no recent rashes or chronic skin disorders            Rheumatologic: The patient reports no history of chronic arthritis, myalgias, arthralgias            Genitourinary:  The patient reports no history of recurrent urinary tract infections, hematuria, dysuria, or nephrolithiasis           Psychiatric: + anxiety           Oncologic: The patient reports no history of prior solid tumor or hematologic malignancy           ENT: The patient reports no hoarseness of voice, hearing loss, sinus congestion, tinnitus           Neurologic: + migraines   PE: BP (!) 123/92   Pulse 68   Temp 97 °F (36.1 °C) (Temporal)   Resp 19   Ht 5' 7\" (1.702 m)   Wt 130 lb (59 kg)   LMP 01/06/2024 (Approximate)   SpO2 99%   BMI 20.36 kg/m²   Gen: AAO x 3, able to speak in complete sentences  HENT: EOMI, PERRL, oropharynx is clear with moist mucosal membranes  Eyes: Sclerae are anicteric  Neck:  Supple without nuchal rigidity  CV: Regular rate and rhythm, with normal S1 and S2  Resp: Clear to auscultation bilaterally without wheezes; rubs, rhonchi, or rales  Abdomen: Soft, epigastric > RUQ tenderness, non-distended with the presence of bowel sounds; No hepatosplenomegaly; no rebound or guarding; No ascites is clinically apparent; no tympany to percussion  Ext: No peripheral edema or cyanosis  Skin: Warm and dry  Psychiatric: Appropriate mood and congruent affect without obvious depression or anxiety  Labs:   Lab Results   Component Value Date    WBC 9.5 02/06/2024    HGB 14.1 02/06/2024    HCT 39.1 02/06/2024    .0 02/06/2024    CREATSERUM 0.66 02/06/2024    BUN 12 02/06/2024     02/06/2024    K 2.9 02/06/2024     02/06/2024    CO2 19.0 02/06/2024     02/06/2024    CA 9.4 02/06/2024    ALB 3.5 02/06/2024     ALKPHO 160 02/06/2024    BILT 0.6 02/06/2024    AST 41 02/06/2024     02/06/2024    LIP 36 02/06/2024     Recent Labs   Lab 02/06/24  1106   *   BUN 12   CREATSERUM 0.66   CA 9.4      K 2.9*      CO2 19.0*     Recent Labs   Lab 02/06/24  1106   RBC 4.43   HGB 14.1   HCT 39.1   MCV 88.3   MCH 31.8   MCHC 36.1   RDW 12.0   NEPRELIM 5.58   WBC 9.5   .0       Recent Labs   Lab 02/06/24  1106   *   AST 41*       Imaging:   PROCEDURE:  US ABDOMEN COMPLETE (CPT=76700)     COMPARISON:  [address] [facility], MR, MRI ABDOMEN&MRCP W/3D (W+W0)(CPT=74183/88631), 7/05/2023, 7:54 AM.     INDICATIONS:  hx PSC, epigastric and ruq pain, last stent 2020     TECHNIQUE:  Real time gray-scale ultrasound was used to evaluate the abdomen.  The exam includes images of the liver, gallbladder, common bile duct, pancreas, spleen, kidneys, IVC, and aorta.     FINDINGS:    LIVER:  Normal size and echogenicity. No significant masses.  BILIARY:  The gallbladder is distended and contains sludge and stones.  There is gallbladder wall thickening measuring up to 5 mm in thickness.  Possibility of early acute or chronic cholecystitis are not entirely excluded.  Clinical correlation  recommended.  HIDA scan may be of further value.  The common bile duct is at the upper limits of normal measuring up to 7 mm in diameter with choledocholithiasis, stricture, or distal obstructing mass not entirely excluded.  Clinical correlation  recommended.  MRCP/ERCP may be of further value.  PANCREAS:  Tail obscured by bowel gas limiting evaluation, otherwise the visualized portions are unremarkable.  SPLEEN:  Unremarkable measuring up to 9.7 cm.  KIDNEYS:  No hydronephrosis.  Right kidney measures 10.1 cm.  Left kidney measures 10.8 cm.  AORTA/IVC:  Visualized portions are unremarkable.       Impression   CONCLUSION:       1. The gallbladder is distended and contains sludge and stones.  There is gallbladder wall thickening measuring up  to 5 mm in thickness.  Possibility of early acute or chronic cholecystitis are not entirely excluded.  Clinical correlation recommended.    HIDA scan may be of further value.       2. The common bile duct is at the upper limits of normal measuring up to 7 mm in diameter with choledocholithiasis, stricture, or distal obstructing mass not entirely excluded.  Clinical correlation recommended.  MRCP/ERCP may be of further value.     3. Limited evaluation of the pancreas due to partial obscuration by bowel gas.     Please see above for further details.     LOCATION:  [id number]    Dictated by (CST): Jude Bradley MD on 2/06/2024 at 1:13 PM      Finalized by (CST): Jude Bradley MD on 2/06/2024 at 1:15 PM      Impression: 23 yr-old female with Pmhx that includes UC (dx 2013; pancolitis, followed at  Dr Cullen Evans currently on Humira weekly--last dose 7 days ago) c/b recurrent C Diff and PSC (dx 2015; followed at  + [facility]) admitted today with severe upper abd pain which progressed to nausea with imaging suggesting gallbladder distention and labs with elevated LFTs. Given her hx, will initiate transfer to her tertiary care center for management. In the interm, will obtain MRI abd + MRCP and await general surgery recommendations    Recommendations:     Initiate transfer to  or [facility]--transfer center called and information provided   MRI abd + MRCP  Fecal calprotectin   IgG to blood in lab  Abx per hospitalist/general surgery recommendations--will start Vancomycin 125 mg PO 3x/daily given hx of recurrent C Diff  Pain management per Hospitalist recommendations; antiemetics as needed   DVT prophylaxis per Hospitalist recommendations   CBC, CMP in AM    Thank you for the consultation, we will follow the patient with you.  Attending addendum (Dr Treviño) to follow later today and provide formal, final recommendations at that time   Kasey Paige, APRN  2:44 PM  2/6/2024  [facility]  Gastroenterology  [phone]    Attending physician addendum:   I personally saw and examined the patient, agree with the above findings, assessment and plan. Pt notes epigastric abdominal pain starting Sunday with radiation to her back. The pain is constant. She notes vomiting today. She denies blood in the emesis. She denies fevers. She has had similar pain episodes in the past. She denies diarrhea, melena, hematochezia, recent antibiotics. She is on Vancomycin TID normally. Her last colonoscopy was 1 year ago. She reports that she is due for her Humira today. Her abdomen is soft, +epigastric ttp, no rebound or guarding. She denies NSAIDs. She is having 1-2 BMs/day. She reports that Dr. Pan has is reluctant for her to have her gallbladder out. Recommend MRI/MRCP.   Recommend transfer to tertiary academic center given PSC and UC. We have called [facility]. Appreciate general surgery recommendations.     Cece Treviño DO  4:42 PM  2/6/2024  [facility] Gastroenterology  [phone]

## 2024-02-06 NOTE — PLAN OF CARE
NURSING ADMISSION NOTE      Patient admitted via Cart  Oriented to room.  Safety precautions initiated.  Bed in low position.  Call light in reach.    Patient admitted from ER from home with . Up stand by/indep. Pulse ox. Awaiting urine and stool samples, hats placed in toilet. Potassium infusing. NPO/ sips with meds. Admission navigator and PTA med list completed, messaged GI regarding patient due for dose of humira today. Stated for patient not to take it today due to concern of infection, relayed information to patient.

## 2024-02-07 NOTE — PLAN OF CARE
Problem: Abdominal Pain     Data: Pt alert and orientated times three. On room air,  in place, SATs WNL. Pt c/o abdominal pain, PRN Dilaudid given with good pain control. IVF infusing, and pt remains NPO. No c/o any nausea. Awaiting a stool sample. Pt c/o some lip swelling on the right lower lip, PRN benadryl given with good relief. Awaiting bed at [facility]. CD from radiology on chart.  at bedside. Will continue to monitor.     Action: Keep pt comfortable and continue to monitor every 2 hours.    Education: POC for the day.    Response: Verbalized understanding.     Problem: GASTROINTESTINAL - ADULT  Goal: Minimal or absence of nausea and vomiting  Description: INTERVENTIONS:  - Maintain adequate hydration with IV or PO as ordered and tolerated  - Nasogastric tube to low intermittent suction as ordered  - Evaluate effectiveness of ordered antiemetic medications  - Provide nonpharmacologic comfort measures as appropriate  - Advance diet as tolerated, if ordered  - Obtain nutritional consult as needed  - Evaluate fluid balance  Outcome: Progressing  Goal: Maintains or returns to baseline bowel function  Description: INTERVENTIONS:  - Assess bowel function  - Maintain adequate hydration with IV or PO as ordered and tolerated  - Evaluate effectiveness of GI medications  - Encourage mobilization and activity  - Obtain nutritional consult as needed  - Establish a toileting routine/schedule  - Consider collaborating with pharmacy to review patient's medication profile  Outcome: Progressing  Goal: Maintains adequate nutritional intake (undernourished)  Description: INTERVENTIONS:  - Monitor percentage of each meal consumed  - Identify factors contributing to decreased intake, treat as appropriate  - Assist with meals as needed  - Monitor I&O, WT and lab values  - Obtain nutritional consult as needed  - Optimize oral hygiene and moisture  - Encourage food from home; allow for food preferences  - Enhance eating  environment  Outcome: Progressing  Goal: Achieves appropriate nutritional intake (bariatric)  Description: INTERVENTIONS:  - Monitor for over-consumption  - Identify factors contributing to increased intake, treat as appropriate  - Monitor I&O, WT and lab values  - Obtain nutritional consult as needed  - Evaluate psychosocial factors contributing to over-consumption  Outcome: Progressing     Problem: Patient/Family Goals  Goal: Patient/Family Long Term Goal  Description: Patient's Long Term Goal:   Discharge to home     Interventions:  - Follow plan of care  - See additional Care Plan goals for specific interventions  Outcome: Progressing  Goal: Patient/Family Short Term Goal  Description: Patient's Short Term Goal:   2/6 noc: reduce pain    Interventions:   - Medication  - GI  - Gen surgery    - See additional Care Plan goals for specific interventions  Outcome: Progressing

## 2024-02-07 NOTE — PLAN OF CARE
Problem: Abdominal pain   Data: Ax04. No tele, Q shift vitals, Afebrile. Regular lung sounds. Continent. No stools overnight. IVF @ 100ml/hr. Up with standby assist. Small rash on right wrist. Oral benadryl given. Rash improved. Epigastric pain, prn dilaudid.  Refused lovenox.   Intervention: IVF, PO vanco, IV dilaudid , IV unasyn   Education: Meds, call light,   Response: Cooperative with care.     Problem: GASTROINTESTINAL - ADULT  Goal: Minimal or absence of nausea and vomiting  Description: INTERVENTIONS:  - Maintain adequate hydration with IV or PO as ordered and tolerated  - Nasogastric tube to low intermittent suction as ordered  - Evaluate effectiveness of ordered antiemetic medications  - Provide nonpharmacologic comfort measures as appropriate  - Advance diet as tolerated, if ordered  - Obtain nutritional consult as needed  - Evaluate fluid balance  Outcome: Progressing  Goal: Maintains or returns to baseline bowel function  Description: INTERVENTIONS:  - Assess bowel function  - Maintain adequate hydration with IV or PO as ordered and tolerated  - Evaluate effectiveness of GI medications  - Encourage mobilization and activity  - Obtain nutritional consult as needed  - Establish a toileting routine/schedule  - Consider collaborating with pharmacy to review patient's medication profile  Outcome: Progressing  Goal: Maintains adequate nutritional intake (undernourished)  Description: INTERVENTIONS:  - Monitor percentage of each meal consumed  - Identify factors contributing to decreased intake, treat as appropriate  - Assist with meals as needed  - Monitor I&O, WT and lab values  - Obtain nutritional consult as needed  - Optimize oral hygiene and moisture  - Encourage food from home; allow for food preferences  - Enhance eating environment  Outcome: Progressing  Goal: Achieves appropriate nutritional intake (bariatric)  Description: INTERVENTIONS:  - Monitor for over-consumption  - Identify factors  contributing to increased intake, treat as appropriate  - Monitor I&O, WT and lab values  - Obtain nutritional consult as needed  - Evaluate psychosocial factors contributing to over-consumption  Outcome: Progressing     Problem: Patient/Family Goals  Goal: Patient/Family Long Term Goal  Description: Patient's Long Term Goal:   Discharge to home     Interventions:  - Follow plan of care  - See additional Care Plan goals for specific interventions  Outcome: Progressing  Goal: Patient/Family Short Term Goal  Description: Patient's Short Term Goal:   2/6 noc: reduce pain    Interventions:   - Medication  - GI  - Gen surgery    - See additional Care Plan goals for specific interventions  Outcome: Progressing

## 2024-02-07 NOTE — PAYOR COMM NOTE
--------------  ADMISSION REVIEW     Payor: [employer]  Subscriber #:  [id number]  Authorization Number: N/A    Admit date: 2/6/24  Admit time:  3:54 PM       History   HPI  23-year-old female presents to the emergency department with complaints of epigastric pain and persistent vomiting.  Patient states that she tried to take a Norco earlier because the pain was getting severe she is unsure if she kept it down or not.  On further discussion she states that she has had a little bit of pain initially on Sunday.  She states that yesterday things were okay but she did not feel quite right she only had Cheerios for dinner.  She states this morning things escalated and worsen.  No diarrhea.  Patient is a complex past medical history that includes history of ulcerative colitis as well as PSC.  She has had 2 stents placed with the most recent one being in 2020.  She states that this does not feel like her PSC pain.  She has not had any change in her stools to be consistent with ulcerative colitis.  No unusual foods.  No fevers or chills.  No recent travel.  No one else is ill.  ED Triage Vitals [02/06/24 1058]   /84   Pulse 76   Resp 20   Temp 97 °F (36.1 °C)   Temp src Temporal   SpO2 97 %   O2 Device None (Room air)     Physical Exam  Constitutional:       General: She is in acute distress.      Appearance: Normal appearance. She is well-developed. She is ill-appearing.      Comments: Actively vomiting appears uncomfortable tearful   HENT:      Head: Normocephalic and atraumatic.   Cardiovascular:      Rate and Rhythm: Normal rate and regular rhythm.      Pulses: Normal pulses.      Heart sounds: Normal heart sounds.   Pulmonary:      Effort: Pulmonary effort is normal.      Breath sounds: Normal breath sounds. No stridor. No wheezing.   Abdominal:      General: Bowel sounds are normal.      Palpations: Abdomen is soft.      Tenderness: There is abdominal tenderness. There is no rebound.       Comments: Mostly epigastric slight right upper quadrant no rebound   Musculoskeletal:         General: No tenderness. Normal range of motion.      Cervical back: Normal range of motion and neck supple.   Lymphadenopathy:      Cervical: No cervical adenopathy.   Skin:     General: Skin is warm and dry.      Capillary Refill: Capillary refill takes less than 2 seconds.      Coloration: Skin is not pale.   Neurological:      General: No focal deficit present.      Mental Status: She is alert and oriented to person, place, and time.      Cranial Nerves: No cranial nerve deficit.      Coordination: Coordination normal.   Labs Reviewed   COMP METABOLIC PANEL (14) - Abnormal; Notable for the following components:       Result Value    Glucose 117 (*)     Potassium 2.9 (*)     CO2 19.0 (*)     AST 41 (*)      (*)     Alkaline Phosphatase 160 (*)     A/G Ratio 0.8 (*)    US ABDOMEN COMPLETE (CPT=76700)  Result Date: 2/6/2024  CONCLUSION:   1. The gallbladder is distended and contains sludge and stones.  There is gallbladder wall thickening measuring up to 5 mm in thickness.  Possibility of early acute or chronic cholecystitis are not entirely excluded.  Clinical correlation recommended.  HIDA scan may be of further value.   2. The common bile duct is at the upper limits of normal measuring up to 7 mm in diameter with choledocholithiasis, stricture, or distal obstructing mass not entirely excluded.  Clinical correlation recommended.  MRCP/ERCP may be of further value.  3. Limited evaluation of the pancreas due to partial obscuration by bowel gas.  Please see above for further details.  LOCATION:  [id number]    Dictated by (CST): Jude Bradley MD on 2/06/2024 at 1:13 PM     Finalized by (CST): Jude Bradley MD on 2/06/2024 at 1:15 PM       Disposition and Plan   Clinical Impression:  1. Intractable abdominal pain    2. Nausea and vomiting in adult    3. Biliary colic    4. PSC (primary sclerosing cholangitis)    5.  Ulcerative pancolitis without complication (HCC)    6. Hypokalemia    7. Metabolic acidosis       Disposition:  Admit  2/6/2024  2:29 pm      History and Physical      History of Present Illness:   Jackie Monte is a 23 year old female with ulcerative colitis, primary sclerosing cholangitis who presents with abdominal pain. Patient was well until this weekend when she began to have upper abdominal pain radiating to her back. Associated was nausea and vomiting. No diarrhea. No fever. Patient groggy after analgesics and history a bit limited. Pain is different than past episodes.  She continued on Vanco.  /78 (BP Location: Left arm)   Pulse 88   Temp 98.8 °F (37.1 °C) (Oral)   Resp 18   Ht 5' 7\" (1.702 m)   Wt 130 lb 9.6 oz (59.2 kg)   LMP 01/06/2024 (Approximate)   SpO2 100%   BMI 20.45 kg/m²   General: No acute distress,   Respiratory: No rhonchi, no wheezes  Cardiovascular: S1, S2. Regular rate and rhythm  Abdomen: Soft, tender, non-distended, positive bowel sounds  Neuro: No new focal deficits  Extremities: No edema     Lab 02/06/24  1106   RBC 4.43   HGB 14.1   HCT 39.1   MCV 88.3   MCH 31.8   MCHC 36.1   RDW 12.0   NEPRELIM 5.58   WBC 9.5   .0      Lab 02/06/24  1106   *   BUN 12   CREATSERUM 0.66   EGFRCR 125   CA 9.4   ALB 3.5      K 2.9*      CO2 19.0*   ALKPHO 160*   AST 41*   *   BILT 0.6   TP 7.9   Assessment & Plan:       #Abdominal pain, possible early acute cholecystitis  #Transaminitis  #Ulcerative colitis  #Primary sclerosing cholangitis  #Anxiety  #History of Cdiff  #Hypokalemia      Plan:  Admit  NPO  IVF  IV abx  Vanco - chronic  Analgesics and antiemetics as needed  MRCP  Monitor LFTs  Isolation   Surgery & GI consult     DVT Px: Lovenox     MEDICATIONS ADMINISTERED IN LAST 1 DAY:  ampicillin-sulbactam (Unasyn) 3 g in sodium chloride 0.9% 100mL IVPB-ADD       Date Action Dose Route User    2/7/2024 0933 New Bag 3 g Intravenous Anabel Abreu RN     2/7/2024 0400 New Bag 3 g Intravenous Justina Diamond RN    2/6/2024 2027 New Bag 3 g Intravenous Justina Diamond RN          budesonide (Entocort EC) DR cap 9 mg       Date Action Dose Route User    2/7/2024 0933 Given 9 mg Oral Anabel Abreu RN    2/6/2024 1759 Given 9 mg Oral Karen Reyes RN          diphenhydrAMINE (Benadryl) 12.5 MG/5ML oral liquid 12.5 mg       Date Action Dose Route User    2/7/2024 1233 Given 12.5 mg Oral Anabel Abreu RN    2/6/2024 2118 Given 12.5 mg Oral Justina Diamond RN          HYDROmorphone (Dilaudid) 1 MG/ML injection 0.4 mg       Date Action Dose Route User    2/7/2024 1240 Given 0.4 mg Intravenous Anabel Abreu RN          HYDROmorphone (Dilaudid) 1 MG/ML injection 0.8 mg       Date Action Dose Route User    2/7/2024 0933 Given 0.8 mg Intravenous Anabel Abreu RN    2/6/2024 2024 Given 0.8 mg Intravenous Justina Diamond RN    2/6/2024 1722 Given 0.8 mg Intravenous Karen Reyes RN          potassium chloride 20 mEq in dextrose 5%-sodium chloride 0.45% 1000mL infusion premix       Date Action Dose Route User    2/7/2024 1334 New Bag (none) Intravenous Anabel Abreu RN    2/6/2024 2119 New Bag (none) Intravenous Justina Diamond RN          potassium chloride 40 mEq in 250mL sodium chloride 0.9% IVPB premix       Date Action Dose Route User    2/6/2024 1538 New Bag 40 mEq Intravenous Nelson Resendez RN          sertraline (Zoloft) tab 100 mg       Date Action Dose Route User    2/7/2024 0933 Given 100 mg Oral Anabel Abreu RN    2/6/2024 1759 Given 100 mg Oral Karen Reyes RN          vancomycin (Vancocin) cap 125 mg       Date Action Dose Route User    2/7/2024 1233 Given 125 mg Oral Anabel Abreu RN    2/7/2024 0933 Given 125 mg Oral Anabel Abreu RN    2/6/2024 2027 Given 125 mg Oral Justina Diamond RN    2/6/2024 1759 Given 125 mg Oral Karen Reyes RN            Vitals (last day)       Date/Time Temp Pulse Resp BP SpO2 Weight  O2 Device O2 Flow Rate (L/min) [facility]    02/07/24 1224 98.7 °F (37.1 °C) 92 18 119/81 98 % -- None (Room air) -- KG    02/07/24 0519 98.6 °F (37 °C) 89 -- 110/70 98 % -- None (Room air) --     02/06/24 1959 98.8 °F (37.1 °C) 88 18 115/78 -- -- None (Room air) --     02/06/24 1617 98.3 °F (36.8 °C) 89 16 122/86 100 % 130 lb 9.6 oz None (Room air) --     02/06/24 1058 97 °F (36.1 °C) 76 20 121/84 97 % 130 lb None (Room air) -- RUFUS

## 2024-02-07 NOTE — DISCHARGE SUMMARY
[facility]  DISCHARGE SUMMARY     Jackie Monte Patient Status:  Inpatient    2000 MRN [id number]   Location [facility] 5NW-A Attending Les Parks DO   Hosp Day # 1 PCP None Pcp     Date of Admission: 2024  Date of Discharge:   2024    Discharge Disposition: Home or Self Care    Discharge Diagnosis:  #Primary sclerosing cholangitis  #Acute cholecystitis   #Choledocholithiasis   #Transaminitis  #Ulcerative colitis  #Primary sclerosing cholangitis  #Anxiety  #History of Cdiff  #Hypokalemia    History of Present Illness:   Jackie Monte is a 23 year old female with ulcerative colitis, primary sclerosing cholangitis and anxiety who presents with abdominal pain. Patient was well until this weekend when she began to have upper abdominal pain radiating to her back. Associated was nausea and vomiting. No diarrhea. No fever. Patient groggy after analgesics and history a bit limited. Pain is different than past episodes. She continued on Vanco.    Brief Synopsis: Patient was started on IV antibiotics. She was made NPO and given IVF. Surgery and GI were consulted. Patient underwent MRCP which showed acute cholecystitis, choledocholithiasis and intrahepatic duct stricturing of left lobe of liver indicating mild primary sclerosing cholangitis. GI recommended transfer to tertiary center given history of established care and Surgery agreed. GI and Surgery spoke with patient's hepatologist at [facility] who accepted patient for transfer to [facility]. Patient was transferred to [place].     Lace+ Score: 52  59-90 High Risk  29-58 Medium Risk  0-28   Low Risk  Patient was referred to the [facility].    TCM Follow-Up Recommendation:  LACE 29-58: Moderate Risk of readmission after discharge from the hospital.      Procedures during hospitalization:   MRCP  US Abd    PROCEDURE:  MRI ABDOMEN&MRCP W/3D (CPT=74181/59226)     COMPARISON:  US [facility], US ABDOMEN COMPLETE (CPT=76700), 2024, 12:39 PM.   [address], MR, MRI ABDOMEN&MRCP W/3D (W+W0)(CPT=74183/81839), 7/05/2023, 7:54 AM.     INDICATIONS:  Abdominal pain /vomiting; hx of PSC     TECHNIQUE:    Multiplanar single shot fast spin echo, chemical shift imaging, breath hold T2 weighted images and 2-D fiesta sequences were acquired. No contrast material was administered. Fluid sensitive imaging with MRCP. Reconstruction was also  performed including 3D multi-projection imaging.  Both projection and source MRCP images are evaluated.     3-D RENDERING:  Additional 3-D rendering was generated by the technologist.     PATIENT STATED HISTORY:(As transcribed by Technologist)  galbllader pain, hx PSC (primary sclerosing cholangitis)         FINDINGS:    LIVER:  No enlargement, atrophy, abnormal density, or significant focal lesion.  There is a simple cyst within the lateral segment left lobe of the liver measuring 13 x 10 mm.  BILIARY:  The gallbladder is greatly distended with thickened gallbladder wall measuring up to 10 mm in thickness.  The common bile duct is mildly dilated at 8 mm. There is a filling defect within the mid common bile duct measuring 2-3 mm in size  consistent with choledocholithiasis.  Unfortunately, there is significant motion artifact which makes the MRCP portion difficult to fully evaluate for sclerosing cholangitis.  Should be noted that in the left lobe of the liver there are areas of  narrowing of the bile ducts within the lateral segment with more distal enlargement of the bile duct which would suggest mild stricturing and can be seen with mild sclerosing cholangitis.  PANCREAS:  No lesion, fluid collection, ductal dilatation, or atrophy.    SPLEEN:  No enlargement or focal lesion.    KIDNEYS:  No mass or obstruction.    ADRENALS:  No mass or enlargement.    AORTA/VASCULAR:  No aneurysm or dissection.    RETROPERITONEUM:  No mass or adenopathy.    BOWEL/MESENTERY:  No visible mass, obstruction, or bowel wall thickening.    ABDOMINAL  WALL:  No mass or hernia.    PELVIC NODES:  Normal.  PELVIC ORGANS:  Normal for age.  BONES:  No bony lesion or fracture.    LUNG BASES:  No visible pleural disease.  Lung bases not well assessed with MRI.    OTHER:  Negative.                     Impression   CONCLUSION:       1.  Extensive distension of the gallbladder with wall thickening which can be seen with acute cholecystitis.     2.  Choledocholithiasis with a small stone within the mid duct.       3. Mild stricturing noted within the intrahepatic ducts of the left lobe of the liver which can be seen with mild primary sclerosing cholangitis.             PROCEDURE:  US ABDOMEN COMPLETE (CPT=76700)     COMPARISON:  [address], MR, MRI ABDOMEN&MRCP W/3D (W+W0)(CPT=74183/41847), 7/05/2023, 7:54 AM.     INDICATIONS:  hx PSC, epigastric and ruq pain, last stent 2020     TECHNIQUE:  Real time gray-scale ultrasound was used to evaluate the abdomen.  The exam includes images of the liver, gallbladder, common bile duct, pancreas, spleen, kidneys, IVC, and aorta.     FINDINGS:    LIVER:  Normal size and echogenicity. No significant masses.  BILIARY:  The gallbladder is distended and contains sludge and stones.  There is gallbladder wall thickening measuring up to 5 mm in thickness.  Possibility of early acute or chronic cholecystitis are not entirely excluded.  Clinical correlation  recommended.  HIDA scan may be of further value.  The common bile duct is at the upper limits of normal measuring up to 7 mm in diameter with choledocholithiasis, stricture, or distal obstructing mass not entirely excluded.  Clinical correlation  recommended.  MRCP/ERCP may be of further value.  PANCREAS:  Tail obscured by bowel gas limiting evaluation, otherwise the visualized portions are unremarkable.  SPLEEN:  Unremarkable measuring up to 9.7 cm.  KIDNEYS:  No hydronephrosis.  Right kidney measures 10.1 cm.  Left kidney measures 10.8 cm.  AORTA/IVC:  Visualized portions are  unremarkable.                   Impression   CONCLUSION:       1. The gallbladder is distended and contains sludge and stones.  There is gallbladder wall thickening measuring up to 5 mm in thickness.  Possibility of early acute or chronic cholecystitis are not entirely excluded.  Clinical correlation recommended.    HIDA scan may be of further value.       2. The common bile duct is at the upper limits of normal measuring up to 7 mm in diameter with choledocholithiasis, stricture, or distal obstructing mass not entirely excluded.  Clinical correlation recommended.  MRCP/ERCP may be of further value.     3. Limited evaluation of the pancreas due to partial obscuration by bowel gas.       Incidental or significant findings and recommendations (brief descriptions):  See brief synopsis above     Lab/Test results pending at Discharge:   None    Consultants:  GI  Surgery    Discharge Medication List:     Discharge Medications        CONTINUE taking these medications        Instructions Prescription details   adalimumab 80 MG/0.8ML Pnkt  Commonly known as: Humira Pen      Inject 160 mg into the skin once.   Refills: 0     albuterol 108 (90 Base) MCG/ACT Aers  Commonly known as: Ventolin HFA      Inhale 2 puffs into the lungs every 6 (six) hours as needed.   Refills: 0     Budesonide 9 MG Tb24      Take 9 mg by mouth daily.   Refills: 0     butalbital-aspirin-caffeine -40 MG Caps  Commonly known as: FIORINAL      Take 1 capsule by mouth every 4 (four) hours as needed for Pain.   Refills: 0     clonazePAM 0.5 MG Tabs  Commonly known as: KlonoPIN      Take 1 tablet (0.5 mg total) by mouth as needed.   Refills: 0     diphenhydrAMINE 12.5 MG/5ML Elix  Commonly known as: Benadryl      Take  by mouth 4 (four) times daily as needed for Allergies.   Refills: 0     ondansetron 4 MG Tbdp  Commonly known as: Zofran-ODT      Take 1 tablet (4 mg total) by mouth every 8 (eight) hours as needed for Nausea.   Refills: 0      Rizatriptan Benzoate 10 MG Tabs  Commonly known as: MAXALT      Take 1 tablet (10 mg total) by mouth as needed for Migraine.   Refills: 0     sertraline 100 MG Tabs  Commonly known as: Zoloft      Take 1 tablet (100 mg total) by mouth daily.   Refills: 0     vancomycin 125 MG Caps  Commonly known as: Vancocin      Take 1 capsule (125 mg total) by mouth 4 (four) times daily.   Refills: 0            STOP taking these medications      budesonide 0.5 MG/2ML Susp  Commonly known as: Pulmicort        ergocalciferol 1.25 MG (61427 UT) Caps  Commonly known as: Vitamin D2        potassium chloride 20 MEQ Tbcr  Commonly known as: K-Dur        vedolizumab 300 MG Solr  Commonly known as: Entyvio               ASK your doctor about these medications        Instructions Prescription details   desloratadine 5 MG Tabs  Commonly known as: Clarinex      Take 5 mg by mouth daily.   Refills: 0              [place]    Follow-up appointment:   Pcp, None  [place] [id number]    Follow up      Cece Treviño DO  [address] [place] [address]  [phone]    Follow up      Aris Wells MD  [address] [place] [address]  [phone]    Follow up      Appointments for Next 30 Days 2024 - 3/8/2024      None            Vital signs:  Temp:  [98.3 °F (36.8 °C)-98.8 °F (37.1 °C)] 98.7 °F (37.1 °C)  Pulse:  [88-92] 92  Resp:  [16-18] 18  BP: (110-122)/(70-86) 119/81  SpO2:  [98 %-100 %] 98 %    Physical Exam:    General: No acute distress   Lungs: clear to auscultation  Cardiovascular: S1, S2  Abdomen: Soft      -----------------------------------------------------------------------------------------------  PATIENT DISCHARGE INSTRUCTIONS: See electronic chart    Les Parks,     Total time spent on discharge plannin minutes     The  [employer] Cures Act makes medical notes like these available to patients in the interest of transparency. Please be advised this is a medical document. Medical  documents are intended to carry relevant information, facts as evident, and the clinical opinion of the practitioner. The medical note is intended as peer to peer communication and may appear blunt or direct. It is written in medical language and may contain abbreviations or verbiage that are unfamiliar.

## 2024-02-07 NOTE — PROGRESS NOTES
[facility]     Hospitalist Progress Note     Jackie Monte Patient Status:  Inpatient    2000 MRN [id number]   Location [facility] 5NW-A Attending Les Parks DO   Hosp Day # 1 PCP None Pcp     Chief Complaint: Abdominal pain    Subjective:     Patient resting in bed. She reports pain is better controlled this morning after pain meds. She denies nausea, vomiting, dyspnea    Objective:    Review of Systems:   A comprehensive review of systems was completed; pertinent positive and negatives stated in subjective.    Vital signs:  Temp:  [98.3 °F (36.8 °C)-98.8 °F (37.1 °C)] 98.7 °F (37.1 °C)  Pulse:  [88-92] 92  Resp:  [16-18] 18  BP: (110-122)/(70-86) 119/81  SpO2:  [98 %-100 %] 98 %    Physical Exam:    General: No acute distress  Respiratory: No wheezes, no rhonchi  Cardiovascular: S1, S2, regular rate and rhythm  Abdomen: Soft, Non-tender, non-distended, positive bowel sounds  Neuro: No new focal deficits.   Extremities: No edema    Diagnostic Data:    Labs:  Recent Labs   Lab 24  1106 24  0642   WBC 9.5 11.1*   HGB 14.1 12.3   MCV 88.3 92.9   .0 225.0       Recent Labs   Lab 24  1106 24  0642   * 94   BUN 12 7*   CREATSERUM 0.66 0.58   CA 9.4 8.5   ALB 3.5 2.9*    136   K 2.9* 3.7    109   CO2 19.0* 24.0   ALKPHO 160* 142*   AST 41* 36   * 100*   BILT 0.6 0.8   TP 7.9 6.7       Estimated Creatinine Clearance: 138.6 mL/min (based on SCr of 0.58 mg/dL).    No results for input(s): \"TROP\", \"TROPHS\", \"CK\" in the last 168 hours.    No results for input(s): \"PTP\", \"INR\" in the last 168 hours.               Microbiology    No results found for this visit on 24.      Imaging: Reviewed in Epic.    Medications:    ampicillin-sulbactam  3 g Intravenous Q6H    sertraline  100 mg Oral Daily    budesonide  9 mg Oral Daily    vancomycin  125 mg Oral QID    enoxaparin  40 mg Subcutaneous Daily       Assessment & Plan:      #Primary sclerosing  cholangitis  #Acute cholecystitis   #Choledocholithiasis   -MRCP reviewed  -IV antibiotics  -NPO  -IVF  -Pain control and antiemetics as needed   -GI following  -Surgery following  -Transfer to tertiary care center initiated    #Transaminitis, improving   -Monitor LFTs    #Hx of C. Diff  -Prophylactic oral Vancomycin       Les Parks DO    Supplementary Documentation:     Quality:  DVT Mechanical Prophylaxis:   SCDs,    DVT Pharmacologic Prophylaxis   Medication    enoxaparin (Lovenox) 40 MG/0.4ML SUBQ injection 40 mg                Code Status: Full Code  Michaels: No urinary catheter in place  [place] Duration (in days):   Central line:    HORACIO: 2/7/2024    Discharge is dependent on: Clinical improvement   At this point Ms. Monte is expected to be discharge to: Home    The 21st Century Cures Act makes medical notes like these available to patients in the interest of transparency. Please be advised this is a medical document. Medical documents are intended to carry relevant information, facts as evident, and the clinical opinion of the practitioner. The medical note is intended as peer to peer communication and may appear blunt or direct. It is written in medical language and may contain abbreviations or verbiage that are unfamiliar.

## 2024-02-07 NOTE — PROGRESS NOTES
[facility]  Progress Note    Jackie Monte Patient Status:  Inpatient    2000 MRN [id number]   Location [facility] 5NW-A Attending Les Parks,    Hosp Day # 1 PCP None Pcp     Subjective:  The patient was seen and examined at bedside.  The patient states her abdominal pain has entirely resolved today.  She denies any nausea or vomiting.  She is passing flatus, but has not had a bowel movement.    Patient was unable to be transferred to [facility] or [facility] as they do not have any available beds.  The patient states she spoke with her GI at [facility].  They informed her that if she is able to be discharged today, she can follow-up with a doctor there today and be directly admitted for further management.    MRCP yesterday revealed extensive distention of the gallbladder with wall thickening.  Of note, the patient does have distention of the gallbladder on prior MRCP's.  There is also concern for a small stone within the duct.  Findings also consistent with primary sclerosing cholangitis.    Slight increase in WBCs today to 11.1.  She is afebrile.  Total bilirubin is within normal limits at 0.9.  ALT elevated at 100 and alkaline phosphatase elevated at 142.  These are chronically elevated for this patient.    Objective/Physical Exam:  /70 (BP Location: Left arm)   Pulse 89   Temp 98.6 °F (37 °C) (Oral)   Resp 18   Ht 67\"   Wt 130 lb 9.6 oz (59.2 kg)   LMP 2024 (Approximate)   SpO2 98%   BMI 20.45 kg/m²     Intake/Output Summary (Last 24 hours) at 2024 0932  Last data filed at 2024 0518  Gross per 24 hour   Intake 2783 ml   Output 352 ml   Net 2431 ml         General: Alert, oriented x3. No acute distress.  HEENT: Normocephalic, atraumatic. No scleral icterus.  Pulmonary: No respiratory distress, effort normal.   Abdomen: Non-distended, without tympany to percussion. Soft, non-tender to palpation. No rebound or guarding. No peritoneal signs.   Incision: Well-healed  incisions from a prior laparoscopic appendectomy  Extremities: No lower extremity edema. No clubbing or cyanosis.   Skin: Warm, dry. No jaundice.       Labs:  Lab Results   Component Value Date    WBC 11.1 02/07/2024    HGB 12.3 02/07/2024    HCT 36.4 02/07/2024    .0 02/07/2024      Lab Results   Component Value Date    INR 1.01 03/31/2023     Lab Results   Component Value Date     02/07/2024    K 3.7 02/07/2024     02/07/2024    CO2 24.0 02/07/2024    BUN 7 02/07/2024    CREATSERUM 0.58 02/07/2024    GLU 94 02/07/2024    CA 8.5 02/07/2024    ALKPHO 142 02/07/2024     02/07/2024    AST 36 02/07/2024    BILT 0.8 02/07/2024    ALB 2.9 02/07/2024    TP 6.7 02/07/2024     Lab Results   Component Value Date    COLORUR Yellow 02/06/2024    CLARITY Clear 02/06/2024    SPECGRAVITY 1.028 02/06/2024    GLUUR Normal 02/06/2024    BILUR Negative 02/06/2024    KETUR 40 02/06/2024    BLOODURINE Negative 02/06/2024    PHURINE 7.5 02/06/2024    PROUR Trace 02/06/2024    UROBILINOGEN Normal 02/06/2024    NITRITE Negative 02/06/2024    LEUUR Negative 02/06/2024       Assessment:  Patient Active Problem List   Diagnosis    Chest pain, non-cardiac    Tachycardia, paroxysmal (HCC)    Ulcerative colitis with complication, unspecified location (HCC)    PSC (primary sclerosing cholangitis)    Ulcerative colitis with complication (HCC)    Pleurisy    Acute cholecystitis    Ulcerative colitis (HCC)    Mild intermittent asthma without complication    Intractable abdominal pain    Nausea and vomiting in adult    Biliary colic    Ulcerative pancolitis without complication (HCC)    Hypokalemia    Metabolic acidosis     Cholecystitis possible choledocholithiasis    Plan:  No plan for acute surgical intervention at this time  The patient that she will ultimately likely require an ERCP with stone removal-to be performed   From a general surgical standpoint, the patient may discharge in follow-up today with her  gastroenterologist at [facility] or [facility] for further management  Dr. Wells will reach out to Dr. Pan, patient's hepatologist, regarding her current hospitalization   The patient should remain n.p.o. for possible procedure  Antiemetics and analgesics as needed  DVT prophylaxis with Lovenox      The patient was discussed with Dr. Wells , and he is in agreement with the assessment and plan. My total face time with this patient was 35 minutes. Greater than half of the visit was spent in counseling the patient on the above listed diagnoses and treatment options.     Angie Telelz PA-C  2/7/2024  9:32 AM    This note was initiated by Angie Tellez PA-C.  The PA saw the patient in conjunction with me. The PA performed a history, exam, and developed the assessment and plan. I agree with the mentioned assessment and plan and have provided further documentation of my own, if necessary.    Patient is feeling better today.  She denies any ongoing abdominal pain, nausea or vomiting.  She appears well on exam and has no abdominal tenderness.  She is afebrile hemodynamically normal.  Her white blood cell count did slightly rise to 11.  LFTs remained stable.    I have personally reviewed the imaging of patient's MRCP. MRCP revealed extensive distention of the gallbladder with wall thickening. Of note, the patient does have distention of the gallbladder on prior MRCP's. There is also concern for a small stone within the common bile duct. Findings also consistent with primary sclerosing cholangitis.    I have personally discussed patient's case with her hepatologist, Dr. Efra Pan at [facility].  He is agreeable to transfer patient to [facility] for further care.  Dr. Pan also spoke to Dr. Treviño.  I agree with transfer to [facility].  Patient is welcome to see me as needed.    Aris Wells MD  [facility] General Surgery

## 2024-02-07 NOTE — PROGRESS NOTES
Gastroenterology Progress Note  Patient Name: Jackie Monte  Chief Complaint: PSC, UC, epigastric pain, abnormal imaging  S: Pt reports that her pain is better today. She denies vomiting.   O: /70 (BP Location: Left arm)   Pulse 89   Temp 98.6 °F (37 °C) (Oral)   Resp 18   Ht 5' 7\" (1.702 m)   Wt 130 lb 9.6 oz (59.2 kg)   LMP 01/06/2024 (Approximate)   SpO2 98%   BMI 20.45 kg/m²   Gen: AAOx3  CV: RRR with normal S1 / S2  Resp: No increased respiratory effort  Abd: (+)BS, soft, Mild epigastric tenderness, non-distended; no rebound or guarding  Ext: No edema or cyanosis  Skin: Warm and dry  Laboratory Data:   Lab Results   Component Value Date    WBC 11.1 02/07/2024    HGB 12.3 02/07/2024    HCT 36.4 02/07/2024    .0 02/07/2024    CREATSERUM 0.58 02/07/2024    BUN 7 02/07/2024     02/07/2024    K 3.7 02/07/2024     02/07/2024    CO2 24.0 02/07/2024    GLU 94 02/07/2024    CA 8.5 02/07/2024    ALB 2.9 02/07/2024    ALKPHO 142 02/07/2024    BILT 0.8 02/07/2024    AST 36 02/07/2024     02/07/2024    LIP 36 02/06/2024    MG 1.6 02/06/2024     Recent Labs   Lab 02/06/24  1106 02/07/24  0642   * 94   BUN 12 7*   CREATSERUM 0.66 0.58   CA 9.4 8.5    136   K 2.9* 3.7    109   CO2 19.0* 24.0     Recent Labs   Lab 02/07/24  0642   RBC 3.92   HGB 12.3   HCT 36.4   MCV 92.9   MCH 31.4   MCHC 33.8   RDW 12.2   NEPRELIM 7.95*   WBC 11.1*   .0       Recent Labs   Lab 02/06/24  1106 02/07/24  0642   * 100*   AST 41* 36     MRI Abd/MRCP:  Impression   CONCLUSION:       1.  Extensive distension of the gallbladder with wall thickening which can be seen with acute cholecystitis.     2.  Choledocholithiasis with a small stone within the mid duct.       3. Mild stricturing noted within the intrahepatic ducts of the left lobe of the liver which can be seen with mild primary sclerosing cholangitis.          Impression: 23 yr-old female with Pmhx that includes UC (dx  2013; pancolitis, followed at  currently on Humira weekly--last dose 7 days ago) c/b recurrent C Diff and PSC (dx 2015; followed at  + [facility]) admitted with upper abd pain which progressed to nausea with imaging suggesting gallbladder distention and labs with elevated LFTs. MRI with choledocholithiasis and changes from PSC as well as gallbladder thickening.   Recommendations:      Spoke with Dr. Pan at [facility]--transfer accepted  Fecal calprotectin when pt can have a BM  IgG 1260 wnl: there was some concern in past that she may have autoimmune component to her PSC  Continue Vancomycin 125 mg PO 3x/daily given hx of recurrent C Diff  Pain management per Hospitalist recommendations; antiemetics as needed   DVT prophylaxis per Hospitalist recommendations   Holding Humira  Give Unasyn while pt is here    She will be transferred to [facility] given her complex medical history. She will be seen by interventional GI, surgery and hepatology.     I have also d/w Dr. Priscilla Treviño,   10:53 AM  2/7/2024  [facility]  [phone]

## 2024-02-08 NOTE — PROGRESS NOTES
NURSING DISCHARGE NOTE    Discharged Home via Wheelchair.  Accompanied by Family member and Support staff  Belongings Taken by patient/family.    IV kept in for transfer to [facility]. Called and gave report to Leeann at [facility] and she verbalized understanding. All paperwork and disc from radiology was sent with the pt.

## 2024-06-15 NOTE — ED PROVIDER NOTES
Patient Seen in: [facility] Emergency Department      History     Chief Complaint   Patient presents with    Headache     Stated Complaint: migraine for over an hour, pt cant see people or understand people.+ blurry vis*    Subjective:   HPI    Patient is a 23-year-old female with a history including migraines, asthma, anxiety presenting with headache.  She states she started getting a little bit yesterday but was not bad.  Woke up this morning at 9 and it was worse.  This is typical of her migraines but has persisted all day, sort of waxing and waning but lasting longer than usual.  Tried her abortive medication at home this morning and did not really help.  Has had a couple episodes of vomiting at home..  No trauma or injury.  Feels like she is confused/hard to focus.    Objective:   Past Medical History:    Anxiety    Anxiety state, unspecified    Asthma (HCC)    Eczema    Extrinsic asthma, unspecified    exercise induded asthma    Food allergy, peanut    Migraines    Nausea and vomiting in adult    PSC (primary sclerosing cholangitis) (HCC)    today 06/14/17-asymptomatic    Ulcerative colitis (HCC)              Past Surgical History:   Procedure Laterality Date    Colonoscopy  08/14/2013    Procedure: COLONOSCOPY;  Surgeon: John Dale MD;  Location:  ENDOSCOPY    Colonoscopy N/A 06/15/2017    Procedure: COLONOSCOPY;  Surgeon: John Dale MD;  Location:  ENDOSCOPY    Ercp,diagnostic      Laparoscopic appendectomy  03/31/2023                Social History     Socioeconomic History    Marital status:    Tobacco Use    Smoking status: Never     Passive exposure: Never    Smokeless tobacco: Never   Vaping Use    Vaping status: Never Used   Substance and Sexual Activity    Alcohol use: No    Drug use: No   Other Topics Concern    Caffeine Concern No    Exercise No     Social Determinants of Health     Food Insecurity: No Food Insecurity (2/6/2024)    Food Insecurity     Food Insecurity:  Never true   Transportation Needs: No Transportation Needs (2/6/2024)    Transportation Needs     Lack of Transportation: No   Housing Stability: Low Risk  (2/6/2024)    Housing Stability     Housing Instability: No              Review of Systems    Positive for stated complaint: migraine for over an hour, pt cant see people or understand people.+ blurry vis*  Other systems are as noted in HPI.  Constitutional and vital signs reviewed.      All other systems reviewed and negative except as noted above.    Physical Exam     ED Triage Vitals [06/15/24 1417]   /79   Pulse 108   Resp 22   Temp 98.3 °F (36.8 °C)   Temp src Oral   SpO2 100 %   O2 Device None (Room air)       Current Vitals:   Vital Signs  BP: 119/78  Pulse: 101  Resp: 20  Temp: 98.3 °F (36.8 °C)  Temp src: Oral  MAP (mmHg): 91    Oxygen Therapy  SpO2: 100 %  O2 Device: None (Room air)            Physical Exam  Vitals and nursing note reviewed.   Constitutional:       Appearance: She is well-developed.   HENT:      Head: Normocephalic and atraumatic.   Eyes:      Conjunctiva/sclera: Conjunctivae normal.      Pupils: Pupils are equal, round, and reactive to light.   Cardiovascular:      Rate and Rhythm: Normal rate and regular rhythm.      Heart sounds: Normal heart sounds.   Pulmonary:      Effort: Pulmonary effort is normal.      Breath sounds: Normal breath sounds.   Abdominal:      General: Bowel sounds are normal.      Palpations: Abdomen is soft.   Musculoskeletal:         General: Normal range of motion.      Cervical back: Normal range of motion and neck supple.   Skin:     General: Skin is warm and dry.   Neurological:      Mental Status: She is alert and oriented to person, place, and time.   Psychiatric:      Comments: Mildly anxious but calm and cooperative.  Answering appropriately.               ED Course   Labs Reviewed - No data to display                   MDM      Pleasant 23-year-old female with a history of migraines presenting for  evaluation of a headache as detailed above.  Most of a little bit yesterday but it has been there more severe today.  Nausea and a couple episodes of vomiting at home.  Awake and alert here, a little anxious but no distress.  Will place an IV, hydrate her and medicate her with Reglan, Benadryl, Toradol and Decadron.  She is asking for something for anxiety as well so we will give her a p.o. Ativan.      Update at 5:55 PM.  Patient had an episode of vomiting just prior to medication being given but after meds she states she is feeling much better and is comfortable going home.        Past Medical History-migraines, asthma, anxiety    Differential diagnosis before testing included migraine    Co-morbidities that add to the complexity of management include: None    Testing ordered during this visit included none        Medications Provided: Ativan, Reglan, Benadryl, Toradol, Decadron              Disposition:          Discharge  I have discussed with the patient the results of test, differential diagnosis, treatment plan, warning signs and symptoms which should prompt immediate return.  They expressed understanding of these instructions and agrees to the following plan provided.  They were given written discharge instructions and agrees to return for any concerns and voiced understanding and all questions were answered.                           Medical Decision Making      Disposition and Plan     Clinical Impression:  1. Migraine without status migrainosus, not intractable, unspecified migraine type         Disposition:  Discharge  6/15/2024  5:59 pm    Follow-up:  Stephen Mclain MD  [address]  [facility] [address]  [phone]    Follow up            Medications Prescribed:  Current Discharge Medication List

## 2024-06-15 NOTE — ED INITIAL ASSESSMENT (HPI)
Patient here for a migraine for the last hour. Hx of migraines. Patient experiencing photosensitivity and bilateral hand numbness, patient states this is normal for her migraines but worse than usual. Endorses feeling like its an ocular migraine but concerned she's having a stroke. Speaking in clear sentences.

## 2024-06-17 NOTE — TELEPHONE ENCOUNTER
RN to pt call, informed her MD will see her Thursday at originally scheduled time.  Pt scheduled.    Future Appointments   Date Time Provider Department Center   6/20/2024  8:30 AM Jermaine Mayes MD [facility] [facility]

## 2024-06-17 NOTE — TELEPHONE ENCOUNTER
Pt calling to report she had an appointment to establish care with MD Gerber this Thurs 6/20 but received a call that it has been cancelled d/t the number of new pt appts that day.  Pt was in  ED 6/15 for ocular migraine, states she had difficulty seeing/hearing people & blurry vision, was told to f/u with PCP since her migraines seem to be happening more frequently over the past year.  Pt's mom is a pt of MD Gerber and she would like to establish care with same provider.  Pt is asking if there is any way she can be accommodated for appt this week.  Advised a message to MD Gerber will be sent.      Please advise if pt can be seen for OV this week, or to where we can schedule her.  Thank you.

## 2024-06-20 NOTE — PROGRESS NOTES
Jackie Monte  12/12/2000    Chief Complaint   Patient presents with    [facility]     Rm 8        HPI:   Jackie Monte is a 23 year old female who presents to [facility]. She has history of UC and PSC. She is currently controlled with budesonide and Humira and Vancomycin, currently using. Her GI team is out of  and [facility]. She also has treanut allergy and mild intermittent asthma, using it mainly for exercise. She has history of migraines, had evaluation with neurologist year ago. She has used Rizatriptan in the past for abortive treatment. Has never been on prophylactic medication. She has had 2 severe migraines over the last month that have now been with aura and more ocular. Was seen in the ED this weekend and was given migraine cocktail with improvement. No recurrent migraine this week.      Current Outpatient Medications   Medication Sig Dispense Refill    Rizatriptan Benzoate 10 MG Oral Tab Take 1 tablet (10 mg total) by mouth as needed for Migraine. 30 tablet 0    sertraline 100 MG Oral Tab Take 1 tablet (100 mg total) by mouth daily.      adalimumab 80 MG/0.8ML Subcutaneous Pen-injector Kit Inject 160 mg into the skin once.      clonazePAM 0.5 MG Oral Tab Take 1 tablet (0.5 mg total) by mouth as needed.  0    vancomycin 125 MG Oral Cap Take 1 capsule (125 mg total) by mouth 4 (four) times daily.      butalbital-aspirin-caffeine -40 MG Oral Cap Take 1 capsule by mouth every 4 (four) hours as needed for Pain.      ondansetron 4 MG Oral Tablet Dispersible Take 1 tablet (4 mg total) by mouth every 8 (eight) hours as needed for Nausea.      Budesonide 9 MG Oral Tablet 24 Hr Take 9 mg by mouth daily.      DiphenhydrAMINE HCl (BENADRYL) 12.5 MG/5ML Oral Elixir Take  by mouth 4 (four) times daily as needed for Allergies.      desloratadine 5 MG Oral Tab Take 1 tablet (5 mg total) by mouth daily.      Albuterol Sulfate HFA (VENTOLIN) 108 (90 BASE) MCG/ACT Inhalation Aero Soln Inhale 2 puffs into the  lungs every 6 (six) hours as needed.        Allergies   Allergen Reactions    Nuts ANAPHYLAXIS     All tree nuts    Peanuts ANAPHYLAXIS      Past Medical History:    Anxiety    Anxiety state, unspecified    Asthma (HCC)    Eczema    Extrinsic asthma, unspecified    exercise induded asthma    Food allergy, peanut    Migraines    Nausea and vomiting in adult    PSC (primary sclerosing cholangitis) (HCC)    today 06/14/17-asymptomatic    Ulcerative colitis (HCC)      Patient Active Problem List   Diagnosis    Chest pain, non-cardiac    Tachycardia, paroxysmal (HCC)    Ulcerative colitis with complication, unspecified location (HCC)    PSC (primary sclerosing cholangitis) (HCC)    Ulcerative colitis with complication (HCC)    Pleurisy    Acute cholecystitis    Ulcerative colitis (HCC)    Mild intermittent asthma without complication (HCC)    Intractable abdominal pain    Nausea and vomiting in adult    Biliary colic    Ulcerative pancolitis without complication (HCC)    Hypokalemia    Metabolic acidosis    Choledocholithiasis    Cholecystitis    S/P cholecystectomy    Migraine with aura and without status migrainosus, not intractable      Past Surgical History:   Procedure Laterality Date    Colonoscopy  08/14/2013    Procedure: COLONOSCOPY;  Surgeon: John Dale MD;  Location:  ENDOSCOPY    Colonoscopy N/A 06/15/2017    Procedure: COLONOSCOPY;  Surgeon: John Dale MD;  Location:  ENDOSCOPY    Ercp,diagnostic      Laparoscopic appendectomy  03/31/2023      Family History   Problem Relation Age of Onset    Hypertension Father     Hypertension Paternal Grandfather     Cancer Paternal Grandfather     Cancer Maternal Grandmother       Social History     Socioeconomic History    Marital status:    Tobacco Use    Smoking status: Never     Passive exposure: Never    Smokeless tobacco: Never   Vaping Use    Vaping status: Never Used   Substance and Sexual Activity    Alcohol use: No    Drug use: No   Other  Topics Concern    Caffeine Concern No    Exercise No     Social Determinants of Health     Food Insecurity: No Food Insecurity (2/6/2024)    Food Insecurity     Food Insecurity: Never true   Transportation Needs: No Transportation Needs (2/6/2024)    Transportation Needs     Lack of Transportation: No   Housing Stability: Low Risk  (2/6/2024)    Housing Stability     Housing Instability: No         REVIEW OF SYSTEMS:   GENERAL: feels well otherwise  SKIN: no rash  EYES: no new vision changes  HEENT: not congested  LUNGS: no new dyspnea  CARDIOVASCULAR: no new chest pain  GI: no new abdominal symptoms, s/p cindy    EXAM:   /76   Temp (!) 96.4 °F (35.8 °C) (Skin)   Resp 16   Ht 5' 8\" (1.727 m)   Wt 149 lb (67.6 kg)   LMP 01/06/2024 (Approximate)   BMI 22.66 kg/m²   GENERAL: Well developed, well nourished,in no apparent distress  SKIN: No rash  EYES: PERRLA, EOMI, conjunctiva are clear  HEENT: atraumatic, normocephalic,ears and throat are clear  NECK: supple  LUNGS: CTAB  CARDIO: RRR  NEURO: CN II-XII intact, no focal neurologic deficits.     ASSESSMENT AND PLAN:   Jackie Monte is a 23 year old female who presents to [facility] care    Migraine with aura and without status migrainosus, not intractable  History of migraines previously responsive to abortive therapy with Maxalt. Has had more frequent and more intense migraines over the last month with aura. Has upcoming neurology evaluation. Will evaluate with MRI brain given change in HA. Discussed continued abortive therapy and trial of Riboflavin for prevention.   - z [facility] MRI BRAIN (CPT=70551); Future  - z [facility] MRI BRAIN (CPT=70551)    Ulcerative colitis with complication, unspecified location (HCC)  PSC (primary sclerosing cholangitis) (HCC)  S/P cholecystectomy  Currently following with GI at  and hepatologist at [facility]. Stable on current treatment.     Mild intermittent asthma without complication (HCC)  Well controlled with trigger avoidance and  PRN use prior to exercise. CTM    All questions were answered and the patient agrees with the plan.     Thank you,  Jermaine Mayes MD

## 2024-07-23 NOTE — TELEPHONE ENCOUNTER
Future Appointments   Date Time Provider Department Center   8/7/2024 12:45 PM JAIME MR RM1 (1.5T)  [address]   8/13/2024  9:00 AM Jermaine Mayes MD [facility]     Added labs to already existing labs.

## 2024-07-27 NOTE — TELEPHONE ENCOUNTER
Refill passed per [facility] protocol.     Requested Prescriptions   Pending Prescriptions Disp Refills    Rizatriptan Benzoate 10 MG Oral Tab 30 tablet 0     Sig: Take 1 tablet (10 mg total) by mouth as needed for Migraine.       Neurology Medications Passed - 7/26/2024  8:36 AM        Passed - In person appointment or virtual visit in the past 6 mos or appointment in next 3 mos     Recent Outpatient Visits              1 month ago Migraine with aura and without status migrainosus, not intractable    [facility], [address] [place] [address] Jermaine Mayes MD    Office Visit    1 [facility]    [facility] Visit    1 [facility]    Office Visit    1 year ago Status post laparoscopic appendectomy    [facility]    [facility]    1 year ago Acute appendicitis, unspecified acute appendicitis type    [facility]    Office Visit          Future Appointments         Provider Department Appt Notes    In 1 week BK MR RM1 (1.5T) [facility] [address]     In 2 weeks Jermaine Mayes MD [facility], [address] [place] [address] last physical unknown

## 2024-08-13 NOTE — PROGRESS NOTES
Jackie Monte  12/12/2000    Chief Complaint   Patient presents with    Well Adult     EJ Rm 8- Pt is here for yearly physical    Test Results     Here to f/u on mri results       HPI:   Jackie Monte is a 23 year old female who presents for CPE.  She completed her MRI which was overall normal.  She did have neurology evaluation and was given Nurtec for abortive therapy which she has not used.  She has still been using rizatriptan which has provided relief.  Her migraine frequency has not significantly changed.  She has upcoming follow-up with her hepatologist.  Diet is  overall healthy but she has not returned to consistent exercise..    Current Outpatient Medications   Medication Sig Dispense Refill    Rizatriptan Benzoate 10 MG Oral Tab Take 1 tablet (10 mg total) by mouth as needed for Migraine. 90 tablet 1    sertraline 100 MG Oral Tab Take 1 tablet (100 mg total) by mouth daily.      adalimumab 80 MG/0.8ML Subcutaneous Pen-injector Kit Inject 160 mg into the skin once.      clonazePAM 0.5 MG Oral Tab Take 1 tablet (0.5 mg total) by mouth as needed.  0    vancomycin 125 MG Oral Cap Take 1 capsule (125 mg total) by mouth 4 (four) times daily.      butalbital-aspirin-caffeine -40 MG Oral Cap Take 1 capsule by mouth every 4 (four) hours as needed for Pain.      ondansetron 4 MG Oral Tablet Dispersible Take 1 tablet (4 mg total) by mouth every 8 (eight) hours as needed for Nausea.      Budesonide 9 MG Oral Tablet 24 Hr Take 9 mg by mouth daily.      DiphenhydrAMINE HCl (BENADRYL) 12.5 MG/5ML Oral Elixir Take  by mouth 4 (four) times daily as needed for Allergies.      desloratadine 5 MG Oral Tab Take 1 tablet (5 mg total) by mouth daily.      Albuterol Sulfate HFA (VENTOLIN) 108 (90 BASE) MCG/ACT Inhalation Aero Soln Inhale 2 puffs into the lungs every 6 (six) hours as needed.        Allergies   Allergen Reactions    Juglans ANAPHYLAXIS     TREE NUTS    Nuts ANAPHYLAXIS     All tree nuts    Peanuts ANAPHYLAXIS       Past Medical History:    Anxiety    Anxiety state, unspecified    Asthma (HCC)    Eczema    Extrinsic asthma, unspecified    exercise induded asthma    Food allergy, peanut    Migraines    Nausea and vomiting in adult    PSC (primary sclerosing cholangitis) (HCC)    today 06/14/17-asymptomatic    Ulcerative colitis (HCC)      Patient Active Problem List   Diagnosis    Chest pain, non-cardiac    Tachycardia, paroxysmal (HCC)    Ulcerative colitis with complication, unspecified location (HCC)    PSC (primary sclerosing cholangitis) (HCC)    Ulcerative colitis with complication (HCC)    Pleurisy    Acute cholecystitis    Ulcerative colitis (HCC)    Mild intermittent asthma without complication (HCC)    Intractable abdominal pain    Nausea and vomiting in adult    Biliary colic    Ulcerative pancolitis without complication (HCC)    Hypokalemia    Metabolic acidosis    Choledocholithiasis    Cholecystitis    S/P cholecystectomy    Migraine with aura and without status migrainosus, not intractable      Past Surgical History:   Procedure Laterality Date    Colonoscopy  08/14/2013    Procedure: COLONOSCOPY;  Surgeon: John Dale MD;  Location:  ENDOSCOPY    Colonoscopy N/A 06/15/2017    Procedure: COLONOSCOPY;  Surgeon: John Dale MD;  Location:  ENDOSCOPY    Ercp,diagnostic      Laparoscopic appendectomy  03/31/2023      Family History   Problem Relation Age of Onset    Hypertension Father     Hypertension Paternal Grandfather     Cancer Paternal Grandfather     Cancer Maternal Grandmother       Social History     Socioeconomic History    Marital status:    Tobacco Use    Smoking status: Never     Passive exposure: Never    Smokeless tobacco: Never   Vaping Use    Vaping status: Never Used   Substance and Sexual Activity    Alcohol use: No    Drug use: No   Other Topics Concern    Caffeine Concern No    Exercise No     Social Determinants of Health     Food Insecurity: No Food Insecurity (2/6/2024)     Food Insecurity     Food Insecurity: Never true   Transportation Needs: No Transportation Needs (2/6/2024)    Transportation Needs     Lack of Transportation: No   Housing Stability: Low Risk  (2/6/2024)    Housing Stability     Housing Instability: No         REVIEW OF SYSTEMS:   GENERAL: feels well otherwise  SKIN: no rash  EYES: no new vision changes  HEENT: not congested  LUNGS: no new dyspnea  CARDIOVASCULAR: no new chest pain  GI: no new abdominal pain  NEURO: see HPI    EXAM:   /72   Pulse 70   Temp 97 °F (36.1 °C) (Temporal)   Resp 18   Ht 5' 8\" (1.727 m)   Wt 152 lb 3.2 oz (69 kg)   LMP 01/06/2024 (Approximate)   SpO2 100%   BMI 23.14 kg/m²   GENERAL: Well developed, well nourished,in no apparent distress  SKIN: No rash  EYES: PERRLA, EOMI, conjunctiva are clear  HEENT: atraumatic, normocephalic,ears and throat are clear  NECK: supple,no adenopathy,no bruits  LUNGS: clear to auscultation  CARDIO: RRR without murmur  GI: good BS's,no masses, HSM or tenderness    ASSESSMENT AND PLAN:   Jackie Monte is a 23 year old female who presents for CPE    Wellness examination  Discussed age-appropriate health and wellness.  Discussed vaccinations including pneumococcal vaccination which she deferred today.  Continue emphasis on healthy diet and exercise.  - Comp Metabolic Panel (14); Future  - CBC With Differential With Platelet; Future  - TSH W Reflex To Free T4; Future  - Lipid Panel; Future    Migraine with aura and without status migrainosus, not intractable  MRI brain overall normal.  She has had evaluation with neurology and will continue with abortive therapy which she is currently done well with rizatriptan.  She was provided Nurtec by her neurologist which she has not yet used.  She has upcoming follow-up with her neurologist in a few months.    Ulcerative colitis with complication, unspecified location (HCC)  S/P cholecystectomy  PSC (primary sclerosing cholangitis) (HCC)  Currently following  with GI at  and hepatologist at [facility]. Stable on current treatment.     Mild intermittent asthma  Well-controlled.     All questions were answered and the patient agrees with the plan.     Thank you,  Jermaine Mayes MD

## 2024-08-26 NOTE — TELEPHONE ENCOUNTER
Refill passed per [facility] protocol.  Requested Prescriptions   Pending Prescriptions Disp Refills    Rizatriptan Benzoate 10 MG Oral Tab 90 tablet 1     Sig: Take 1 tablet (10 mg total) by mouth as needed for Migraine.       Neurology Medications Passed - 8/24/2024  9:33 AM        Passed - In person appointment or virtual visit in the past 6 mos or appointment in next 3 mos     Recent Outpatient Visits              1 week ago Wellness examination    [facility], Genna Perry Michael, MD    Office Visit    2 months ago Migraine with aura and without status migrainosus, not intractable    [facility], Genna Perry Michael, MD    Office Visit    1 year ago Post-operative [facility], Genna Mason Mykala, PA-C    Office Visit    1 year ago Postoperative [facility], Genna Mason Nikki, PA    Office Visit    1 year ago Status post laparoscopic appendectomy    [facility], Genna Mason Mykala, PA-C    Office Visit                         Recent Outpatient Visits              1 week ago Wellness examination    [facility], Genna Perry Michael, MD    Office Visit    2 months ago Migraine with aura and without status migrainosus, not intractable    [facility], Genna Perry Michael, MD    Office Visit    1 year ago Post-operative [facility], Genna Mason Mykala, PA-C    Office Visit    1 year ago Postoperative [facility], Genna Mason Nikki, PA    Office Visit    1 year ago Status post laparoscopic appendectomy    [facility], Genna Mason Mykala, PA-C    Office Visit

## 2024-09-24 NOTE — TELEPHONE ENCOUNTER
Patients epi pen is  and wondering if Dr Jermaine Mayes will refill her epi pen. She stated her allergist is not willing to refill as she is due for an appointment. She stated she has a busy schedule and unable to get in this week.   Jackie Monte requesting Medication Refill for:    Medication name and dose (copy and paste from medication list):     Epi pen dual pack    If medication is not on medication list - transfer patient to RN queue for triage    Preferred Pharmacy:   [id number] - [place], [place] - [facility] [place] [address] [phone], [phone]   [address] [place] [id number]   Phone: [phone] Fax: [phone]   Hours: Open 24 hours       LOV: 2024   Last Refill date:   Next Scheduled appointment:   Future Appointments   Date Time Provider Department Center   10/4/2024  1:45 PM [facility] MR RM1 (1.5T) [facility] [address]

## 2024-10-21 NOTE — TELEPHONE ENCOUNTER
Called and spoke with pt. Notified pt MK sent in Diflucan. Notified if symptoms do not resolve, pt will need eval in office. Pt verbalizes understanding and is appreciative of order.

## 2024-10-21 NOTE — TELEPHONE ENCOUNTER
Reason for Disposition   Vulvar itching and not improved > 3 days following Care Advice   Symptoms of a 'yeast infection' (i.e., itchy, white discharge, not bad smelling) and not improved > 3 days following Care Advice    Protocols used: Vulvar Symptoms-A-[place]    Action Requested: Summary for Provider     []  Critical Lab, Recommendations Needed  [] Need Additional Advice  []   FYI    [x]   Need Orders  [] Need Medications Sent to Pharmacy  []  Other     SUMMARY: Pt was seen in [facility] last week (notes available in careeverywhere). Pt states she is on multiple abx and has hx of autoimmune issues. Pt stated she believes she has a yeast infection. Tried Monistat with no relief. C/o itching and discomfort. Pt stated she is closing on a house today and is moving. Looking for rx.     Reason for call: Yeast Infection    LOV 8/13/24    - Would you order rx or do you recommend WIC?

## 2025-04-25 NOTE — ED PROVIDER NOTES
Patient Seen in: [facility] In [place]      History     Chief Complaint   Patient presents with    Headache    Trauma     Stated Complaint: hit head on Sat and having headaches,nausea since    Subjective:     HPI    24-year-old woman with anxiety and ulcerative colitis who reports a history of migraines, experiencing them approximately twice a month, often triggered by light. She has been experiencing persistent headaches and severe migraines throughout the past week, following two head injuries. The first incident occurred on Wednesday of last week while on vacation in [place], where she hit her head while climbing a waterfall in [facility]. The second incident happened last Saturday at home when she hit her head on a bathroom vanity, resulting in a brief blackout and seeing stars. Since then, she has experienced continuous headaches and severe migraines, including a migraine with aura yesterday. She reports associated symptoms of nausea, dizziness, and hemiplegic migraines, which involve weakness on one side of the body, sometimes affecting her tongue. She has been taking Zofran for nausea. Jackie has a neurologist at [facility] and has previously visited the ER due to hemiplegic migraines, fearing a stroke.     Objective:   Past Medical History:    Anxiety    Anxiety state, unspecified    Asthma (HCC)    Eczema    Extrinsic asthma, unspecified    exercise induded asthma    Food allergy, peanut    Migraines    Nausea and vomiting in adult    PSC (primary sclerosing cholangitis) (HCC)    today 06/14/17-asymptomatic    Ulcerative colitis (HCC)              Past Surgical History:   Procedure Laterality Date    Colonoscopy  08/14/2013    Procedure: COLONOSCOPY;  Surgeon: John Dale MD;  Location:  ENDOSCOPY    Colonoscopy N/A 06/15/2017    Procedure: COLONOSCOPY;  Surgeon: John Dale MD;  Location:  ENDOSCOPY    Ercp,diagnostic      Laparoscopic appendectomy  03/31/2023                 Social History     Socioeconomic History    Marital status:    Tobacco Use    Smoking status: Never     Passive exposure: Never    Smokeless tobacco: Never   Vaping Use    Vaping status: Never Used   Substance and Sexual Activity    Alcohol use: No    Drug use: No   Other Topics Concern    Caffeine Concern No    Exercise No     Social Drivers of Health     Food Insecurity: Low Risk  (10/18/2024)    Received from [facility]    Food Insecurity     Have there been times that your food ran out, and you didn't have money to get more?: No     Are there times that you worry that this might happen?: No   Transportation Needs: Low Risk  (10/18/2024)    Received from [facility]    Transportation Needs     Do you have trouble getting transportation to medical appointments?: No   Housing Stability: Low Risk  (10/18/2024)    Received from [facility]    Housing Stability     Are you worried that your electric, gas, oil, or water might be shut off?: No     Are you concerned about having a safe and reliable place to live?: No              Review of Systems    Positive for stated complaint: hit head on Sat and having headaches,nausea since  Other systems are as noted in HPI.  Constitutional and vital signs reviewed.      All other systems reviewed and negative except as noted above.    Physical Exam     ED Triage Vitals [04/25/25 0951]   BP (!) 132/94   Pulse 90   Resp 16   Temp 98.3 °F (36.8 °C)   Temp src    SpO2 100 %   O2 Device None (Room air)       Current:/86   Pulse 73   Temp 98.3 °F (36.8 °C)   Resp 16   Ht 172.7 cm (5' 8\")   Wt 68 kg   LMP 04/21/2025 (Approximate)   SpO2 100%   BMI 22.81 kg/m²     General:  Vitals as listed.  No acute distress   HEENT: Sclerae anicteric.  Conjunctivae show no pallor.  Oropharynx clear, mucous membranes moist   Lungs: good air exchange and clear   Heart: regular rate rhythm  Extremities: no edema,  normal peripheral pulses   Neuro: Alert oriented and nonfocal    ED COURSE and MDM     Differential diagnosis before testing included migraine headache versus ruptured subarachnoid hemorrhage, a medical condition that poses a threat to life.    I reviewed prior external notes including MRI of the brain that was done 8/7/2024 and showed no acute intracranial abnormality.    Given Toradol.  To use Tylenol at home.  She cannot take NSAIDs due to of ulcerative colitis.  She can follow-up with her primary care physician.    I have discussed with the patient the results of testing, differential diagnosis, and treatment plan. They expressed clear understanding of these instructions and agrees to the plan provided.    Disposition and Plan     Clinical Impression:  1. Other migraine without status migrainosus, not intractable    2. Concussion without loss of consciousness, initial encounter         Disposition:  Discharge  4/25/2025 11:44 am    Follow-up:  Jermaine Mayes MD  [address] [place] [id number]  [phone]    Follow up in 1 week(s)  As needed -or your neurologist at [facility]        Medications Prescribed:  Discharge Medication List as of 4/25/2025 11:45 AM

## (undated) DEVICE — LAPAROVUE VISIBILITY SYSTEM LAPAROSCOPIC SOLUTIONS: Brand: LAPAROVUE

## (undated) DEVICE — TROCAR: Brand: KII FIOS FIRST ENTRY

## (undated) DEVICE — SLEEVE KENDALL SCD EXPRESS MED

## (undated) DEVICE — APPLICATOR CHLORAPREP 26ML

## (undated) DEVICE — FORCEP BIOPSY RJ4 LG CAP W/ND

## (undated) DEVICE — 3M™ RED DOT™ MONITORING ELECTRODE WITH FOAM TAPE AND STICKY GEL, 50/BAG, 20/CASE, 72/PLT 2570: Brand: RED DOT™

## (undated) DEVICE — THE ECHELON, ECHELON ENDOPATH™ AND ECHELON FLEX™ FAMILIES OF ENDOSCOPIC LINEAR CUTTERS AND RELOADS ARE STERILE, SINGLE PATIENT USE INSTRUMENTS THAT SIMULTANEOUSLY CUT AND STAPLE TISSUE. THERE ARE SIX STAGGERED ROWS OF STAPLES, THREE ON EITHER SIDE OF THE CUT LINE. THE 45 MM INSTRUMENTS HAVE A STAPLE LINE THATIS APPROXIMATELY 45 MM LONG AND A CUT LINE THAT IS APPROXIMATELY 42 MM LONG. THE SHAFT CAN ROTATE FREELY IN BOTH DIRECTIONS AND AN ARTICULATION MECHANISM ON ARTICULATING INSTRUMENTS ENABLES BENDING THE DISTAL PORTIONOF THE SHAFT TO FACILITATE LATERAL ACCESS OF THE OPERATIVE SITE.THE INSTRUMENTS ARE SHIPPED WITHOUT A RELOAD AND MUST BE LOADED PRIOR TO USE. A STAPLE RETAINING CAP ON THE RELOAD PROTECTS THE STAPLE LEG POINTS DURING SHIPPING AND TRANSPORTATION. THE INSTRUMENTS’ LOCK-OUT FEATURE IS DESIGNED TO PREVENT A USED RELOAD FROM BEING REFIRED.: Brand: ECHELON ENDOPATH

## (undated) DEVICE — SUT VICRYL 0 J608H

## (undated) DEVICE — POUCH SPECIMEN WIRE 6X3 250ML

## (undated) DEVICE — LAP CHOLE/APPY CDS-LF: Brand: MEDLINE INDUSTRIES, INC.

## (undated) DEVICE — ENSEAL X1 TISSUE SEALER, CURVED JAW, 37 CM SHAFT LENGTH: Brand: ENSEAL

## (undated) DEVICE — PENCIL TELESCOPE MEGADYNE SE

## (undated) DEVICE — #11 STERILE BLADE: Brand: POLYMER COATED BLADES

## (undated) DEVICE — SUT MONOCRYL 4-0 PS-2 Y496G

## (undated) DEVICE — ENDOPATH ULTRA VERESS INSUFFLATION NEEDLES WITH LUER LOCK CONNECTORS: Brand: ENDOPATH

## (undated) DEVICE — TRADITIONAL MARYLAND DISSECTOR TIP, DISPOSABLE: Brand: RENEW

## (undated) DEVICE — HUNTER GASPER TIP, DISPOSABLE: Brand: RENEW

## (undated) DEVICE — TROCAR: Brand: KII® SLEEVE

## (undated) DEVICE — STERILE POLYISOPRENE POWDER-FREE SURGICAL GLOVES: Brand: PROTEXIS

## (undated) DEVICE — 1200CC GUARDIAN II: Brand: GUARDIAN

## (undated) DEVICE — ECHELON 3000 45 STANDARD: Brand: ECHELON

## (undated) DEVICE — SUT PDS II 0 CT-1 Z340H

## (undated) DEVICE — MEGADYNE ELECTRODE ADULT PT RT

## (undated) DEVICE — 40580 - THE PINK PAD - ADVANCED TRENDELENBURG POSITIONING KIT: Brand: 40580 - THE PINK PAD - ADVANCED TRENDELENBURG POSITIONING KIT

## (undated) DEVICE — DERMABOND CLOSURE 0.7ML TOPICL

## (undated) DEVICE — Device: Brand: DEFENDO AIR/WATER/SUCTION AND BIOPSY VALVE

## (undated) DEVICE — LIGHT HANDLE

## (undated) DEVICE — ENDOSCOPY PACK - LOWER: Brand: MEDLINE INDUSTRIES, INC.

## (undated) DEVICE — FILTERLINE NASAL ADULT O2/CO2

## (undated) DEVICE — SOL NACL IRRIG 0.9% 1000ML BTL

## (undated) NOTE — IP AVS SNAPSHOT
[facility] [place] [address] [place], [address] ~ [phone]                Discharge Summary   6/15/2017    Josselin Pan           Admission Information        Provider Department    6/15/2017 Albert Pa MD  Endoscopy - Do not drink alcohol today. Medication:  - If you have questions about resuming your normal medications, please contact your Primary Care Physician. Activities:  - Take it easy today. Do not return to work today. - Do not drive today.   - Do not op [address] to help you get signed up for insurance coverage. [address] is a Federal Navigator program that can help with your Affordable Care Act coverage, as well as all types of Medicaid plans.   To get signed up and covere

## (undated) NOTE — IP AVS SNAPSHOT
Patient Demographics     Address  [address]  [facility] [address] Phone  [phone] (Home)  [phone] (Mobile) *Preferred* E-mail Address  [email or web address]      Patient Contacts     Name Relation Home Work Mobile    MARY ELLEN BAUGH Spouse [phone]    Kenyetta Munoz Mother [phone]      Allergies as of 2/7/2024  Review status set to Review Complete on 2/6/2024       Noted Reaction Type Reactions    DELETED: Amoxicillin 09/06/2015    HIVES    Nuts 08/09/2013   Systemic ANAPHYLAXIS    All tree nuts    DELETED: Other 08/09/2013    ANAPHYLAXIS    All tree nuts    Peanuts 08/09/2013    ANAPHYLAXIS    DELETED: Latex 08/09/2013        Avoids due to allergies    DELETED: Motrin [ibuprofen] 08/09/2013        Eye swelling      Code Status Information     Code Status    Full Code      Patient Instructions    None      Follow-up Information     Pcp, None Follow up.    Contact information:  [place] [id number]             Cece Treviño,  Follow up.    Specialty: GASTROENTEROLOGY  Contact information:  [address] [id number] [phone]             Aris Wells MD Follow up.    Specialties: Surgery, Colon & Rectal, SURGERY, GENERAL  Contact information:  [address]  [place] [address]  [phone]                        Your Home Meds List      TAKE these medications       Instructions Authorizing Provider Morning Afternoon Evening As Needed   adalimumab 80 MG/0.8ML Pnkt  Commonly known as: Humira Pen  Next dose due: Take per home regimen      Inject 160 mg into the skin once.          albuterol 108 (90 Base) MCG/ACT Aers  Commonly known as: Ventolin HFA      Inhale 2 puffs into the lungs every 6 (six) hours as needed.          Budesonide 9 MG Tb24  Next dose due: 2/8      Take 9 mg by mouth daily.          butalbital-aspirin-caffeine -40 MG Caps  Commonly known as: FIORINAL      Take 1 capsule by mouth every 4 (four) hours as  needed for Pain.          clonazePAM 0.5 MG Tabs  Commonly known as: KlonoPIN      Take 1 tablet (0.5 mg total) by mouth as needed.          diphenhydrAMINE 12.5 MG/5ML Elix  Commonly known as: Benadryl      Take  by mouth 4 (four) times daily as needed for Allergies.          ondansetron 4 MG Tbdp  Commonly known as: Zofran-ODT      Take 1 tablet (4 mg total) by mouth every 8 (eight) hours as needed for Nausea.          Rizatriptan Benzoate 10 MG Tabs  Commonly known as: MAXALT      Take 1 tablet (10 mg total) by mouth as needed for Migraine.          sertraline 100 MG Tabs  Commonly known as: Zoloft  Next dose due: 2/8      Take 1 tablet (100 mg total) by mouth daily.          vancomycin 125 MG Caps  Commonly known as: Vancocin  Next dose due: 2/7 2100      Take 1 capsule (125 mg total) by mouth 4 (four) times daily.             ASK your doctor about these medications       Instructions Authorizing Provider Morning Afternoon Evening As Needed   desloratadine 5 MG Tabs  Commonly known as: Clarinex      Take 5 mg by mouth daily.                   [id number] - MAR ACTION REPORT  (last 48 hrs)    ** SITE UNKNOWN **     Order ID Medication Name Action Time Action Reason Comments    [id number] HYDROmorphone (Dilaudid) 1 MG/ML injection 0.4 mg (Or Linked Group #1) 02/07/24 1240 Given      [id number] HYDROmorphone (Dilaudid) 1 MG/ML injection 0.8 mg (Or Linked Group #1) 02/06/24 1722 Given      [id number] HYDROmorphone (Dilaudid) 1 MG/ML injection 0.8 mg 02/06/24 2024 Given      [id number] HYDROmorphone (Dilaudid) 1 MG/ML injection 0.8 mg 02/07/24 0933 Given      [id number] ampicillin-sulbactam (Unasyn) 3 g in sodium chloride 0.9% 100mL IVPB-ADD 02/06/24 1458 New Bag      [id number] ampicillin-sulbactam (Unasyn) 3 g in sodium chloride 0.9% 100mL IVPB-ADD 02/06/24 2027 New Bag      [id number] ampicillin-sulbactam (Unasyn) 3 g in sodium chloride 0.9% 100mL IVPB-ADD 02/07/24 0400 New Bag      [id number] ampicillin-sulbactam (Unasyn)  3 g in sodium chloride 0.9% 100mL IVPB-ADD 02/07/24 0933 New Bag      [id number] ampicillin-sulbactam (Unasyn) 3 g in sodium chloride 0.9% 100mL IVPB-ADD 02/07/24 1607 New Bag      [id number] budesonide (Entocort EC) DR cap 9 mg 02/06/24 1759 Given      [id number] budesonide (Entocort EC) DR cap 9 mg 02/07/24 0933 Given      [id number] diphenhydrAMINE (Benadryl) 12.5 MG/5ML oral liquid 12.5 mg 02/06/24 2118 Given      [id number] diphenhydrAMINE (Benadryl) 12.5 MG/5ML oral liquid 12.5 mg 02/07/24 1233 Given      [id number] diphenhydrAMINE (Benadryl) 50 mg/mL  injection 50 mg 02/06/24 1131 Given      [id number] metoclopramide (Reglan) 5 mg/mL injection 10 mg 02/06/24 1131 Given      [id number] potassium chloride 20 mEq in dextrose 5%-sodium chloride 0.45% 1000mL infusion premix 02/06/24 2119 New Bag      [id number] potassium chloride 20 mEq in dextrose 5%-sodium chloride 0.45% 1000mL infusion premix 02/07/24 1334 New Bag      [id number] potassium chloride 20 mEq/100mL IVPB premix 20 mEq 02/06/24 1232 New Bag      [id number] potassium chloride 40 mEq in 250mL sodium chloride 0.9% IVPB premix 02/06/24 1538 New Bag      [id number] sertraline (Zoloft) tab 100 mg 02/06/24 1759 Given      [id number] sertraline (Zoloft) tab 100 mg 02/07/24 0933 Given      [id number] sodium chloride 0.9 % IV bolus 1,000 mL 02/06/24 1232 New Bag      [id number] vancomycin (Vancocin) cap 125 mg 02/06/24 1759 Given      [id number] vancomycin (Vancocin) cap 125 mg 02/06/24 2027 Given      [id number] vancomycin (Vancocin) cap 125 mg 02/07/24 0933 Given      [id number] vancomycin (Vancocin) cap 125 mg 02/07/24 1233 Given      [id number] vancomycin (Vancocin) cap 125 mg 02/07/24 1606 Given              Recent Vital Signs    Flowsheet Row Most Recent Value   /81 Filed at 02/07/2024 1224   Pulse 92 Filed at 02/07/2024 1224   Resp 18 Filed at 02/07/2024 1224   Temp 98.7 °F (37.1 °C) Filed at 02/07/2024 1224   SpO2 98 % Filed at 02/07/2024 1224      Patient's Most  Recent Weight    Flowsheet Row Most Recent Value   Patient Weight 59.2 kg (130 lb 9.6 oz)         Lab Results Last 24 Hours      Comp Metabolic Panel (14) [[id number]] (Abnormal)  Resulted: 02/07/24 0732, Result status: Final result   Ordering provider: Thom Mas MD  02/06/24 2300 Resulting lab: [facility] ([facility])    Specimen Information    Type Source Collected On   Blood — 02/07/24 0642          Components    Component Value Reference Range Flag Lab   Glucose 94 70 - 99 mg/dL — [facility] ([facility])   Sodium 136 136 - 145 mmol/L — [facility] ([facility])   Potassium 3.7 3.5 - 5.1 mmol/L — [facility])   Chloride 109 98 - 112 mmol/L — [facility])   CO2 24.0 21.0 - 32.0 mmol/L — [facility] ([facility])   Anion Gap 3 0 - 18 mmol/L — [facility])   BUN 7 9 - 23 mg/dL L [facility] ([facility])   Creatinine 0.58 0.55 - 1.02 mg/dL — [facility] ([facility])   Calcium, Total 8.5 8.5 - 10.1 mg/dL — [facility] ([facility])   Calculated Osmolality 280 275 - 295 mOsm/kg — [facility] ([facility])   eGFR-Cr 129 >=60 mL/min/1.73m2 — [facility] ([facility])   AST 36 15 - 37 U/L — [facility] ([facility])    13 - 56 U/L [facility] ([facility])   Alkaline Phosphatase 142 37 - 98 U/L H [facility] ([facility])   Bilirubin, Total 0.8 0.1 - 2.0 mg/dL — [facility] ([facility])   Total Protein 6.7 6.4 - 8.2 g/dL — [facility] ([facility])   Albumin 2.9 3.4 - 5.0 g/dL L [facility] ([facility])   Globulin  3.8 2.8 - 4.4 g/dL — [facility] ([facility])   A/G Ratio 0.8 1.0 - 2.0 L [facility] ([facility])            CBC With Differential With Platelet [[id number]] (Abnormal)  Resulted: 02/07/24 0722, Result status: Final result   Ordering provider: Thom Mas MD  02/06/24 2300 Resulting lab: [facility] ([facility])   Narrative:  The following orders were created for panel order CBC With Differential With Platelet.  Procedure                               Abnormality         Status                     ---------                               -----------         ------                      CBC W/ DIFFERENTIAL[[id number]]          Abnormal            Final result                 Please view results for these tests on the individual orders.    Specimen Information    Type Source Collected On   Blood — 02/07/24 0642            Magnesium [[id number]] (Normal)  Resulted: 02/06/24 2058, Result status: Final result   Ordering provider: Thom Mas MD  02/06/24 2035 Resulting lab: [facility] ([facility])    Specimen Information    Type Source Collected On   Blood — 02/06/24 1734          Components    Component Value Reference Range Flag Lab   Magnesium 1.6 1.6 - 2.6 mg/dL — [facility] [place] [facility])            Pregnancy Test, Urine [[id number]] (Normal)  Resulted: 02/06/24 2051, Result status: Final result   Ordering provider: Thom Mas MD  02/06/24 1647 Resulting lab: [facility] ([facility])    Specimen Information    Type Source Collected On   Urine — 02/06/24 1848          Components    Component Value Reference Range Flag Lab   HCG URINE QUALITATIVE Negative Negative — [facility])            Urinalysis, Routine [[id number]] (Abnormal)  Resulted: 02/06/24 1859, Result status: Final result   Ordering provider: Leila Brothers MD  02/06/24 1123 Resulting lab: [facility] ([facility])    Specimen Information    Type Source Collected On   Urine Urine, clean catch 02/06/24 1847          Components    Component Value Reference Range Flag Lab   Urine Color Yellow Yellow — [facility] ([facility])   Clarity Urine Clear Clear — [facility] [place] [facility])   Spec Gravity 1.028 1.005 - 1.030 — [place] [facility])   Glucose Urine Normal Normal mg/dL — [facility])   Bilirubin Urine Negative Negative — [facility] ([facility])   Ketones Urine 40 Negative mg/dL A [facility] ([facility])   Blood Urine Negative Negative — [facility])   pH Urine 7.5 5.0 - 8.0 — [facility])   Protein Urine Trace Negative mg/dL A [facility] ([facility])   Urobilinogen Urine Normal Normal mg/dL —  [facility] ([facility])   Nitrite Urine Negative Negative — [facility] ([facility])   Leukocyte Esterase Urine Negative Negative — [facility] ([facility])   Microscopic Microscopic not indicated — — [facility] ([facility])            Immunoglobulin G, Quant [[id number]] (Normal)  Resulted: 24 1839, Result status: Final result   Ordering provider: Kasey Del Real APRN  24 1626 Resulting lab: [facility] ([facility])    Specimen Information    Type Source Collected On   Blood — 24 1734          Components    Component Value Reference Range Flag Lab   Immunoglobulin G 1,260 791 - 1,643 mg/dL — [place] [facility])            Testing Performed By     Lab - Abbreviation Name Director Address Valid Date Range    139 - [facility] [facility] ([facility]) [facility] ([facility]) Goldberg, Cathryn A. MD [address] [place] [id number] 20 1441 - Present            Microbiology Results (All)     Procedure Component Value Units Date/Time    Blood Culture [[id number]] Collected: 24 1441    Order Status: Resulted Lab Status: In process Updated: 24 144    Specimen: Blood,peripheral     Blood Culture [[id number]] Collected: 24 1442    Order Status: Resulted Lab Status: In process Updated: 24 144    Specimen: Blood,peripheral       Pending Labs     Order Current Status    Blood Culture In process    Blood Culture In process         H&P - H&P Note      H&P signed by Thom Mas MD at 2024  8:38 PM  Version 1 of 1    Author: Thom Mas MD Service: — Author Type: Physician    Filed: 2024  8:38 PM Date of Service: 2024  2:31 PM Status: Signed    : Thom Mas MD (Physician)         [facility]     Jackie Monte Patient Status:  Emergency    2000 MRN [id number]   Location [facility] EMERGENCY DEPARTMENT Attending Leila Brothers MD   Hosp Day # 0 PCP None Pcp     Chief Complaint: Abdominal pain     Subjective:     History of Present Illness:     Jackie Monte is a 23 year old female with ulcerative colitis, primary sclerosing cholangitis and anxiety who presents with abdominal pain. Patient was well until this weekend when she began to have upper abdominal pain radiating to her back. Associated was nausea and vomiting. No diarrhea. No fever. Patient groggy after analgesics and history a bit limited. Pain is different than past episodes.    She continued on Vanco.    History/Other:    Past Medical History:  Past Medical History:   Diagnosis Date    Anxiety     Anxiety state, unspecified     Asthma     Eczema     Extrinsic asthma, unspecified     exercise induded asthma    Food allergy, peanut     Migraines     Nausea and vomiting in adult 2/6/2024    PSC (primary sclerosing cholangitis)     today 06/14/17-asymptomatic    Ulcerative colitis (HCC)      Past Surgical History:   Past Surgical History:   Procedure Laterality Date    COLONOSCOPY  08/14/2013    Procedure: COLONOSCOPY;  Surgeon: John Dale MD;  Location:  ENDOSCOPY    COLONOSCOPY N/A 06/15/2017    Procedure: COLONOSCOPY;  Surgeon: John Dale MD;  Location:  ENDOSCOPY    ERCP,DIAGNOSTIC      LAPAROSCOPIC APPENDECTOMY  03/31/2023      Family History:   Family History   Problem Relation Age of Onset    Hypertension Father     Hypertension Paternal Grandfather     Cancer Paternal Grandfather     Cancer Maternal Grandmother      Social History:    reports that she has never smoked. She has never been exposed to tobacco smoke. She has never used smokeless tobacco. She reports that she does not drink alcohol and does not use drugs.     Allergies:   Allergies   Allergen Reactions    Nuts ANAPHYLAXIS     All tree nuts    Peanuts ANAPHYLAXIS       Medications:    No current facility-administered medications on file prior to encounter.     Current Outpatient Medications on File Prior to Encounter   Medication Sig Dispense Refill    sertraline 100 MG Oral Tab Take  1 tablet (100 mg total) by mouth daily.      Rizatriptan Benzoate 10 MG Oral Tab Take 1 tablet (10 mg total) by mouth as needed for Migraine.      adalimumab 80 MG/0.8ML Subcutaneous Pen-injector Kit Inject 160 mg into the skin once.      vancomycin 125 MG Oral Cap Take 1 capsule (125 mg total) by mouth 4 (four) times daily.      butalbital-aspirin-caffeine -40 MG Oral Cap Take 1 capsule by mouth every 4 (four) hours as needed for Pain.      ondansetron 4 MG Oral Tablet Dispersible Take 1 tablet (4 mg total) by mouth every 8 (eight) hours as needed for Nausea.      Budesonide 9 MG Oral Tablet 24 Hr Take 9 mg by mouth daily.      Albuterol Sulfate HFA (VENTOLIN) 108 (90 BASE) MCG/ACT Inhalation Aero Soln Inhale 2 puffs into the lungs every 6 (six) hours as needed.      Potassium Chloride ER 20 MEQ Oral Tab CR Take 1 tablet (20 mEq total) by mouth 2 (two) times daily. (Patient not taking: No sig reported) 14 tablet 0    clonazePAM 0.5 MG Oral Tab Take 1 tablet (0.5 mg total) by mouth as needed.  0    ergocalciferol 60569 units Oral Cap Take 1 capsule (50,000 Units total) by mouth once a week. (Patient not taking: Reported on 2/6/2024)      Vedolizumab 300 MG Intravenous Recon Soln Inject 300 mg into the vein. (Patient not taking: No sig reported)      budesonide 0.5 MG/2ML Inhalation Suspension Take 0.5 mg by nebulization as needed. (Patient not taking: Reported on 2/6/2024)      DiphenhydrAMINE HCl (BENADRYL) 12.5 MG/5ML Oral Elixir Take  by mouth 4 (four) times daily as needed for Allergies.      desloratadine 5 MG Oral Tab Take 5 mg by mouth daily. (Patient not taking: Reported on 12/27/2022)         Review of Systems:   A comprehensive review of systems was completed.    Pertinent positives and negatives noted in the HPI.    Objective:   Physical Exam:    /78 (BP Location: Left arm)   Pulse 88   Temp 98.8 °F (37.1 °C) (Oral)   Resp 18   Ht 5' 7\" (1.702 m)   Wt 130 lb 9.6 oz (59.2 kg)   LMP  01/06/2024 (Approximate)   SpO2 100%   BMI 20.45 kg/m²   General: No acute distress,   Respiratory: No rhonchi, no wheezes  Cardiovascular: S1, S2. Regular rate and rhythm  Abdomen: Soft, tender, non-distended, positive bowel sounds  Neuro: No new focal deficits  Extremities: No edema    Results:    Labs:      Labs Last 24 Hours:    Recent Labs   Lab 02/06/24  1106   RBC 4.43   HGB 14.1   HCT 39.1   MCV 88.3   MCH 31.8   MCHC 36.1   RDW 12.0   NEPRELIM 5.58   WBC 9.5   .0       Recent Labs   Lab 02/06/24  1106   *   BUN 12   CREATSERUM 0.66   EGFRCR 125   CA 9.4   ALB 3.5      K 2.9*      CO2 19.0*   ALKPHO 160*   AST 41*   *   BILT 0.6   TP 7.9       Lab Results   Component Value Date    INR 1.01 03/31/2023       No results for input(s): \"TROP\", \"TROPHS\", \"CK\" in the last 168 hours.    No results for input(s): \"TROP\", \"PBNP\" in the last 168 hours.    No results for input(s): \"PCT\" in the last 168 hours.    Imaging: Imaging data reviewed in Epic.    Assessment & Plan:      #Abdominal pain, possible early acute cholecystitis  #Transaminitis  #Ulcerative colitis  #Primary sclerosing cholangitis  #Anxiety  #History of Cdiff  #Hypokalemia      Plan:  Admit  NPO  IVF  IV abx  Vanco - chronic  Analgesics and antiemetics as needed  MRCP  Monitor LFTs  Isolation   Surgery & GI consult    DVT Px: Lovenox     Plan of care discussed with patient, , ER and surgical team.     Thom Mas MD    Supplementary Documentation:     The 21st Century Cures Act makes medical notes like these available to patients in the interest of transparency. Please be advised this is a medical document. Medical documents are intended to carry relevant information, facts as evident, and the clinical opinion of the practitioner. The medical note is intended as peer to peer communication and may appear blunt or direct. It is written in medical language and may contain abbreviations or verbiage that are  unfamiliar.                                 Electronically signed by Thom Mas MD on 2024  8:38 PM              Consults - MD Consult Notes      Consults signed by Aris Wells MD at 2024  7:32 PM     Author: Aris Wells MD Service: General Surgery Author Type: Physician    Filed: 2024  7:32 PM Date of Service: 2024  5:05 PM Status: Signed    : Aris Wells MD (Physician)     Consult Orders    1. Consult to General Surgery [[id number]] ordered by Leila Brothers MD at [address] [place] [address]  Report of  Surgical Consultation with History and Physical Exam    Jackie Monte Patient Status:  Inpatient    2000 MRN [id number]   Location [facility] 5NW-A Attending Thom Mas MD   Hosp Day # 0 PCP None Pcp     Reason for Surgical Consultation:  I am seeing this patient at the request of Dr. Brothers for epigastric abdominal pain.    History of Present Illness:  Jackie Monte is a a(n) 23 year old female who presented to the ER earlier this morning with worsening epigastric abdominal pain.  The patient states the pain began on .  She states yesterday she ate a \"bland\" diet to help alleviate her symptoms.  She states she had some toast and a banana.  She states her pain is associated with nausea and vomiting.  The pain continued to worsen today, so she presented to the emergency department.    Upon presentation to the emergency department, abdominal ultrasound revealed a distended gallbladder with sludge and stones.  There is gallbladder wall thickening.  There is possible early acute or chronic cholecystitis.  A HIDA scan was recommended.  The common bile duct is at the upper limit of normal measuring up to 7 mm in diameter.  When comparing ultrasound to MRCP of the abdomen from 2023, the radiologist notes a distended gallbladder at that time.    The patient's vital signs are stable.  She is afebrile.  WBCs are 9.5.  Hemoglobin 14.1.  Platelets  285,000.  Patient is within normal limits at 36.  Hypokalemic at 2.9.  AST, ALT and alkaline phosphatase elevated to 41, 118 and 160 respectively.  Total bilirubin is within normal limits at 0.6.    The patient has a fairly complex past medical history.  The patient has a past medical history significant for ulcerative colitis and primary sclerosing cholangitis (pancolitis).  She follows with Dr. Arredondo at [facility] as her gastroenterologist and Dr. Pan at [facility] as her hepatologist.  She states if she requires further management, she would prefer to be transferred to [facility] as they are aware of her complex medical history.  Additionally, she has a past medical history significant for C. difficile colitis and asthma.  She receives Humira weekly.    Her past surgical history is significant for laparoscopic appendectomy with Dr. Wells in March 2023.            History:  Past Medical History:   Diagnosis Date    Anxiety     Anxiety state, unspecified     Asthma     Eczema     Extrinsic asthma, unspecified     exercise induded asthma    Food allergy, peanut     Migraines     Nausea and vomiting in adult 2/6/2024    PSC (primary sclerosing cholangitis)     today 06/14/17-asymptomatic    Ulcerative colitis (HCC)      Past Surgical History:   Procedure Laterality Date    COLONOSCOPY  08/14/2013    Procedure: COLONOSCOPY;  Surgeon: John Dale MD;  Location:  ENDOSCOPY    COLONOSCOPY N/A 06/15/2017    Procedure: COLONOSCOPY;  Surgeon: John Dale MD;  Location:  ENDOSCOPY    ERCP,DIAGNOSTIC      LAPAROSCOPIC APPENDECTOMY  03/31/2023     Family History   Problem Relation Age of Onset    Hypertension Father     Hypertension Paternal Grandfather     Cancer Paternal Grandfather     Cancer Maternal Grandmother       reports that she has never smoked. She has never been exposed to tobacco smoke. She has never used smokeless tobacco. She reports that she does not drink alcohol and does not use  drugs.    Allergies:  Allergies   Allergen Reactions    Nuts ANAPHYLAXIS     All tree nuts    Peanuts ANAPHYLAXIS       Medications:    Current Facility-Administered Medications:     sodium chloride 0.9% infusion, , Intravenous, Continuous    potassium chloride 40 mEq in 250mL sodium chloride 0.9% IVPB premix, 40 mEq, Intravenous, Once    clonazePAM (KlonoPIN) tab 0.5 mg, 0.5 mg, Oral, PRN    rizatriptan (MAXALT) tab, 10 mg, Oral, PRN    ampicillin-sulbactam (Unasyn) 3 g in sodium chloride 0.9% 100mL IVPB-ADD, 3 g, Intravenous, Q6H    potassium chloride 20 mEq in dextrose 5%-sodium chloride 0.45% 1000mL infusion premix, , Intravenous, Continuous    HYDROmorphone (Dilaudid) 1 MG/ML injection 0.2 mg, 0.2 mg, Intravenous, Q2H PRN **OR** HYDROmorphone (Dilaudid) 1 MG/ML injection 0.4 mg, 0.4 mg, Intravenous, Q2H PRN **OR** HYDROmorphone (Dilaudid) 1 MG/ML injection 0.8 mg, 0.8 mg, Intravenous, Q2H PRN    acetaminophen (Ofirmev) 10 mg/mL infusion premix 1,000 mg, 1,000 mg, Intravenous, Q6H PRN    ondansetron (Zofran) 4 MG/2ML injection 4 mg, 4 mg, Intravenous, Q6H PRN    prochlorperazine (Compazine) 10 MG/2ML injection 5 mg, 5 mg, Intravenous, Q8H PRN    butalbital-acetaminophen-caffeine (Fioricet) -40 MG per tab 1 tablet, 1 tablet, Oral, Q6H PRN    sertraline (Zoloft) tab 100 mg, 100 mg, Oral, Daily    diphenhydrAMINE (Benadryl) 12.5 MG/5ML oral elixir 12.5 mg, 12.5 mg, Oral, QID PRN    Budesonide TB24 9 mg, 9 mg, Oral, Daily    vancomycin (Vancocin) cap 125 mg, 125 mg, Oral, QID    Review of Systems:  Pertinent items are noted in HPI.  Allergic/Immuno:  Review of patient's allergies performed.  Cardiovascular:  Negative for cool extremity and irregular heartbeat/palpitations  Constitutional:  Negative for decreased activity, fever, irritability and lethargy  Endocrine:  Negative for abnormal sleep patterns, increased activity, polydipsia and polyphagia  ENMT:  Negative for ear drainage, hearing loss and nasal  drainage  Eyes:  Negative for eye discharge and vision loss  Gastrointestinal:   Negative for abdominal pain, constipation, decreased appetite, diarrhea and vomiting  Genitourinary:  Negative for dysuria and hematuria  Hema/Lymph:  Negative for easy bleeding and easy bruising  Integumentary:  Negative for pruritus and rash  Musculoskeletal:  Negative for bone/joint symptoms  Neurological:  Negative for gait disturbance  Psychiatric:  Negative for inappropriate interaction and psychiatric symptoms  Respiratory:  Negative for cough, dyspnea and wheezing      Physical Exam:  Blood pressure 122/86, pulse 89, temperature 98.3 °F (36.8 °C), temperature source Oral, resp. rate 16, height 67\", weight 130 lb 9.6 oz (59.2 kg), last menstrual period 01/06/2024, SpO2 100%.    General: Alert, orientated x3.  Cooperative.  No apparent distress.    HEENT: Exam is unremarkable.  Without scleral icterus.  Mucous membranes are moist. EOM are WNL.    Neck: No tenderness to palpitation.  Full range of motion to flexion and extension, lateral rotation and lateral flexion of cervical spine.  No JVD. Supple.     Lungs: No secondary use of accessory respiratory musculature.    Abdomen: Soft, not distended without tympany to percussion.  Tenderness to palpation in the epigastric region.  No tenderness to palpation in the right upper quadrant.  No rebound or guarding.  Negative Pagan sign.    Extremities:  No lower extremity edema noted.  Without clubbing or cyanosis.      Skin: Normal texture and turgor.      Laboratory Data and Relevant X-rays:  Recent Labs   Lab 02/06/24  1106   RBC 4.43   HGB 14.1   HCT 39.1   MCV 88.3   MCH 31.8   MCHC 36.1   RDW 12.0   NEPRELIM 5.58   WBC 9.5   .0       Recent Labs   Lab 02/06/24  1106   *   BUN 12   CREATSERUM 0.66   CA 9.4   ALB 3.5      K 2.9*      CO2 19.0*   ALKPHO 160*   AST 41*   *   BILT 0.6   TP 7.9         No results for input(s): \"PTP\", \"INR\", \"PTT\" in the  last 168 hours.    Imaging    Narrative   PROCEDURE:  US ABDOMEN COMPLETE (CPT=76700)     COMPARISON:  [address] [facility], MR, MRI ABDOMEN&MRCP W/3D (W+W0)(CPT=74183/46781), 7/05/2023, 7:54 AM.     INDICATIONS:  hx PSC, epigastric and ruq pain, last stent 2020     TECHNIQUE:  Real time gray-scale ultrasound was used to evaluate the abdomen.  The exam includes images of the liver, gallbladder, common bile duct, pancreas, spleen, kidneys, IVC, and aorta.     FINDINGS:    LIVER:  Normal size and echogenicity. No significant masses.  BILIARY:  The gallbladder is distended and contains sludge and stones.  There is gallbladder wall thickening measuring up to 5 mm in thickness.  Possibility of early acute or chronic cholecystitis are not entirely excluded.  Clinical correlation  recommended.  HIDA scan may be of further value.  The common bile duct is at the upper limits of normal measuring up to 7 mm in diameter with choledocholithiasis, stricture, or distal obstructing mass not entirely excluded.  Clinical correlation  recommended.  MRCP/ERCP may be of further value.  PANCREAS:  Tail obscured by bowel gas limiting evaluation, otherwise the visualized portions are unremarkable.  SPLEEN:  Unremarkable measuring up to 9.7 cm.  KIDNEYS:  No hydronephrosis.  Right kidney measures 10.1 cm.  Left kidney measures 10.8 cm.  AORTA/IVC:  Visualized portions are unremarkable.                   Impression   CONCLUSION:       1. The gallbladder is distended and contains sludge and stones.  There is gallbladder wall thickening measuring up to 5 mm in thickness.  Possibility of early acute or chronic cholecystitis are not entirely excluded.  Clinical correlation recommended.    HIDA scan may be of further value.       2. The common bile duct is at the upper limits of normal measuring up to 7 mm in diameter with choledocholithiasis, stricture, or distal obstructing mass not entirely excluded.  Clinical correlation recommended.  MRCP/ERCP may be  of further value.     3. Limited evaluation of the pancreas due to partial obscuration by bowel gas.     Please see above for further details.     LOCATION:  [id number]           Dictated by (CST): Jude Bradley MD on 2/06/2024 at 1:13 PM      Finalized by (CST): Jude Bradley MD on 2/06/2024 at 1:15 PM       Angie Tellez PA-C  2/6/2024  5:05 PM    Is this a shared or split note between Advanced Practice Provider and Physician? Yes      Impression:  Patient Active Problem List   Diagnosis    Chest pain, non-cardiac    Tachycardia, paroxysmal (HCC)    Ulcerative colitis with complication, unspecified location (HCC)    PSC (primary sclerosing cholangitis)    Ulcerative colitis with complication (HCC)    Pleurisy    Acute cholecystitis    Ulcerative colitis (HCC)    Mild intermittent asthma without complication    Intractable abdominal pain    Nausea and vomiting in adult    Biliary colic    Ulcerative pancolitis without complication (HCC)    Hypokalemia    Metabolic acidosis       Jackie Monte is a a(n) 23 year old female who presented to the ER earlier this morning with worsening epigastric abdominal pain.  The patient states the pain began on Sunday.  She states yesterday she ate a \"bland\" diet to help alleviate her symptoms.  She states she had some toast and a banana.  She states her pain is associated with nausea and vomiting.  The pain continued to worsen today, so she presented to the emergency department.    Upon presentation to the emergency department, abdominal ultrasound revealed a distended gallbladder with sludge and stones.  There is gallbladder wall thickening.  There is possible early acute or chronic cholecystitis.  A HIDA scan was recommended.  The common bile duct is at the upper limit of normal measuring up to 7 mm in diameter.  When comparing ultrasound to MRCP of the abdomen from July 2023, the radiologist notes a distended gallbladder at that time.    The patient's vital signs are  stable.  She is afebrile.  WBCs are 9.5.  Hemoglobin 14.1.  Platelets 285,000.  Patient is within normal limits at 36.  Hypokalemic at 2.9.  AST, ALT and alkaline phosphatase elevated to 41, 118 and 160 respectively.  Total bilirubin is within normal limits at 0.6.    The patient has a fairly complex past medical history.  The patient has a past medical history significant for ulcerative colitis and primary sclerosing cholangitis (pancolitis).  She follows with Dr. Arredondo at [facility] as her gastroenterologist and Dr. Pan at [facility] as her hepatologist.  She states if she requires further management, she would prefer to be transferred to [facility] as they are aware of her complex medical history.  Additionally, she has a past medical history significant for C. difficile colitis and asthma.  She receives Humira weekly.    Her past surgical history is significant for laparoscopic appendectomy performed by me in March 2023.  She appears generally well on exam.  She has epigastric tenderness.    Plan:  I discussed the case with Dr. Treviño (GI) and Dr. Mas (hospitalist). I agree with Dr. Treviño that transfer to a tertiary academic center is reasonable.  Patient has already established care with specialists at [facility] and [facility].  No plans for urgent/emergent cholecystectomy at this time.    In the meantime, I agree with further imaging evaluation of the biliary system and gallbladder with MRCP.  Please keep n.p.o. with IV fluids for now.  Continue pain and nausea medication as needed.  Okay for DVT prophylaxis.  Surgery will continue to follow, please contact with any further questions or concerns.    Aris Wells MD  [facility] General Surgery        Electronically signed by Aris Wells MD on 2/6/2024  7:32 PM              Discharge Summary - D/C Summary      Discharge Summary signed by Les Parks DO at 2/7/2024  3:31 PM  Version 2 of 2    Author: Les Parks DO Service: Hospitalist Author Type:  Physician    Filed: 2024  3:31 PM Date of Service: 2024 10:22 AM Status: Addendum    : Les Parks DO (Physician)    Related Notes: Original Note by Les Parks DO (Physician) filed at 2024  3:27 PM       [facility]     Jackie Monte Patient Status:  Inpatient    2000 MRN [id number]   Location [facility] 5NW-A Attending Les Parks DO   Hosp Day # 1 PCP None Pcp     Date of Admission: 2024  Date of Discharge:   2024    Discharge Disposition: Home or Self Care    Discharge Diagnosis:  #Primary sclerosing cholangitis  #Acute cholecystitis   #Choledocholithiasis   #Transaminitis  #Ulcerative colitis  #Primary sclerosing cholangitis  #Anxiety  #History of Cdiff  #Hypokalemia    History of Present Illness:   Jackie Monte is a 23 year old female with ulcerative colitis, primary sclerosing cholangitis and anxiety who presents with abdominal pain. Patient was well until this weekend when she began to have upper abdominal pain radiating to her back. Associated was nausea and vomiting. No diarrhea. No fever. Patient groggy after analgesics and history a bit limited. Pain is different than past episodes. She continued on Vanco.    Brief Synopsis: Patient was started on IV antibiotics. She was made NPO and given IVF. Surgery and GI were consulted. Patient underwent MRCP which showed acute cholecystitis, choledocholithiasis and intrahepatic duct stricturing of left lobe of liver indicating mild primary sclerosing cholangitis. GI recommended transfer to tertiary center given history of established care and Surgery agreed. GI and Surgery spoke with patient's hepatologist at [facility] who accepted patient for transfer to [facility]. Patient was transferred to [place].     Lace+ Score: 52  59-90 High Risk  29-58 Medium Risk  0-28   Low Risk  Patient was referred to the [facility].    TCM Follow-Up Recommendation:  LACE 29-58: Moderate Risk of readmission  after discharge from the hospital.      Procedures during hospitalization:   MRCP  US Abd    PROCEDURE:  MRI ABDOMEN&MRCP W/3D (CPT=74181/40380)     COMPARISON:  [facility] , US, US ABDOMEN COMPLETE (CPT=76700), 2/06/2024, 12:39 PM.  [facility], MR, MRI ABDOMEN&MRCP W/3D (W+W0)(CPT=74183/29922), 7/05/2023, 7:54 AM.     INDICATIONS:  Abdominal pain /vomiting; hx of PSC     TECHNIQUE:    Multiplanar single shot fast spin echo, chemical shift imaging, breath hold T2 weighted images and 2-D fiesta sequences were acquired. No contrast material was administered. Fluid sensitive imaging with MRCP. Reconstruction was also  performed including 3D multi-projection imaging.  Both projection and source MRCP images are evaluated.     3-D RENDERING:  Additional 3-D rendering was generated by the technologist.     PATIENT STATED HISTORY:(As transcribed by Technologist)  galbllader pain, hx PSC (primary sclerosing cholangitis)         FINDINGS:    LIVER:  No enlargement, atrophy, abnormal density, or significant focal lesion.  There is a simple cyst within the lateral segment left lobe of the liver measuring 13 x 10 mm.  BILIARY:  The gallbladder is greatly distended with thickened gallbladder wall measuring up to 10 mm in thickness.  The common bile duct is mildly dilated at 8 mm. There is a filling defect within the mid common bile duct measuring 2-3 mm in size  consistent with choledocholithiasis.  Unfortunately, there is significant motion artifact which makes the MRCP portion difficult to fully evaluate for sclerosing cholangitis.  Should be noted that in the left lobe of the liver there are areas of  narrowing of the bile ducts within the lateral segment with more distal enlargement of the bile duct which would suggest mild stricturing and can be seen with mild sclerosing cholangitis.  PANCREAS:  No lesion, fluid collection, ductal dilatation, or atrophy.    SPLEEN:  No enlargement or focal lesion.    KIDNEYS:  No mass or  obstruction.    ADRENALS:  No mass or enlargement.    AORTA/VASCULAR:  No aneurysm or dissection.    RETROPERITONEUM:  No mass or adenopathy.    BOWEL/MESENTERY:  No visible mass, obstruction, or bowel wall thickening.    ABDOMINAL WALL:  No mass or hernia.    PELVIC NODES:  Normal.  PELVIC ORGANS:  Normal for age.  BONES:  No bony lesion or fracture.    LUNG BASES:  No visible pleural disease.  Lung bases not well assessed with MRI.    OTHER:  Negative.                     Impression   CONCLUSION:       1.  Extensive distension of the gallbladder with wall thickening which can be seen with acute cholecystitis.     2.  Choledocholithiasis with a small stone within the mid duct.       3. Mild stricturing noted within the intrahepatic ducts of the left lobe of the liver which can be seen with mild primary sclerosing cholangitis.             PROCEDURE:  US ABDOMEN COMPLETE (CPT=76700)     COMPARISON:  [facility], MR, MRI ABDOMEN&MRCP W/3D (W+W0)(CPT=74183/22736), 7/05/2023, 7:54 AM.     INDICATIONS:  hx PSC, epigastric and ruq pain, last stent 2020     TECHNIQUE:  Real time gray-scale ultrasound was used to evaluate the abdomen.  The exam includes images of the liver, gallbladder, common bile duct, pancreas, spleen, kidneys, IVC, and aorta.     FINDINGS:    LIVER:  Normal size and echogenicity. No significant masses.  BILIARY:  The gallbladder is distended and contains sludge and stones.  There is gallbladder wall thickening measuring up to 5 mm in thickness.  Possibility of early acute or chronic cholecystitis are not entirely excluded.  Clinical correlation  recommended.  HIDA scan may be of further value.  The common bile duct is at the upper limits of normal measuring up to 7 mm in diameter with choledocholithiasis, stricture, or distal obstructing mass not entirely excluded.  Clinical correlation  recommended.  MRCP/ERCP may be of further value.  PANCREAS:  Tail obscured by bowel gas limiting evaluation,  otherwise the visualized portions are unremarkable.  SPLEEN:  Unremarkable measuring up to 9.7 cm.  KIDNEYS:  No hydronephrosis.  Right kidney measures 10.1 cm.  Left kidney measures 10.8 cm.  AORTA/IVC:  Visualized portions are unremarkable.                   Impression   CONCLUSION:       1. The gallbladder is distended and contains sludge and stones.  There is gallbladder wall thickening measuring up to 5 mm in thickness.  Possibility of early acute or chronic cholecystitis are not entirely excluded.  Clinical correlation recommended.    HIDA scan may be of further value.       2. The common bile duct is at the upper limits of normal measuring up to 7 mm in diameter with choledocholithiasis, stricture, or distal obstructing mass not entirely excluded.  Clinical correlation recommended.  MRCP/ERCP may be of further value.     3. Limited evaluation of the pancreas due to partial obscuration by bowel gas.       Incidental or significant findings and recommendations (brief descriptions):  See brief synopsis above     Lab/Test results pending at Discharge:   None    Consultants:  GI  Surgery    Discharge Medication List:     Discharge Medications        CONTINUE taking these medications        Instructions Prescription details   adalimumab 80 MG/0.8ML Pnkt  Commonly known as: Humira Pen      Inject 160 mg into the skin once.   Refills: 0     albuterol 108 (90 Base) MCG/ACT Aers  Commonly known as: Ventolin HFA      Inhale 2 puffs into the lungs every 6 (six) hours as needed.   Refills: 0     Budesonide 9 MG Tb24      Take 9 mg by mouth daily.   Refills: 0     butalbital-aspirin-caffeine -40 MG Caps  Commonly known as: FIORINAL      Take 1 capsule by mouth every 4 (four) hours as needed for Pain.   Refills: 0     clonazePAM 0.5 MG Tabs  Commonly known as: KlonoPIN      Take 1 tablet (0.5 mg total) by mouth as needed.   Refills: 0     diphenhydrAMINE 12.5 MG/5ML Elix  Commonly known as: Benadryl      Take  by  mouth 4 (four) times daily as needed for Allergies.   Refills: 0     ondansetron 4 MG Tbdp  Commonly known as: Zofran-ODT      Take 1 tablet (4 mg total) by mouth every 8 (eight) hours as needed for Nausea.   Refills: 0     Rizatriptan Benzoate 10 MG Tabs  Commonly known as: MAXALT      Take 1 tablet (10 mg total) by mouth as needed for Migraine.   Refills: 0     sertraline 100 MG Tabs  Commonly known as: Zoloft      Take 1 tablet (100 mg total) by mouth daily.   Refills: 0     vancomycin 125 MG Caps  Commonly known as: Vancocin      Take 1 capsule (125 mg total) by mouth 4 (four) times daily.   Refills: 0            STOP taking these medications      budesonide 0.5 MG/2ML Susp  Commonly known as: Pulmicort        ergocalciferol 1.25 MG (47318 UT) Caps  Commonly known as: Vitamin D2        potassium chloride 20 MEQ Tbcr  Commonly known as: K-Dur        vedolizumab 300 MG Solr  Commonly known as: Entyvio               ASK your doctor about these medications        Instructions Prescription details   desloratadine 5 MG Tabs  Commonly known as: Clarinex      Take 5 mg by mouth daily.   Refills: 0              [place]    Follow-up appointment:   Pcp, None  [place] [address]    Follow up      Cece Treviño DO  [address] [address]  [phone]    Follow up      Aris Wells MD  [address] [address]  [phone]    Follow up      Appointments for Next 30 Days 2/7/2024 - 3/8/2024      None            Vital signs:  Temp:  [98.3 °F (36.8 °C)-98.8 °F (37.1 °C)] 98.7 °F (37.1 °C)  Pulse:  [88-92] 92  Resp:  [16-18] 18  BP: (110-122)/(70-86) 119/81  SpO2:  [98 %-100 %] 98 %    Physical Exam:    General: No acute distress   Lungs: clear to auscultation  Cardiovascular: S1, S2  Abdomen: Soft      -----------------------------------------------------------------------------------------------  PATIENT DISCHARGE INSTRUCTIONS: See electronic chart    Les Parks,      Total time spent on discharge plannin minutes     The  [employer] C[employer] Act makes medical notes like these available to patients in the interest of transparency. Please be advised this is a medical document. Medical documents are intended to carry relevant information, facts as evident, and the clinical opinion of the practitioner. The medical note is intended as peer to peer communication and may appear blunt or direct. It is written in medical language and may contain abbreviations or verbiage that are unfamiliar.       Electronically signed by Les Parks DO on 2024  3:31 PM     Discharge Summary signed by Les Parks DO at 2024  3:27 PM  Version 1 of 2    Author: Les Parks DO Service: Hospitalist Author Type: Physician    Filed: 2024  3:27 PM Date of Service: 2024 10:22 AM Status: Signed    : Les Parks DO (Physician)    Related Notes: Addendum by Les Parks DO (Physician) filed at 2024  3:31 PM       [facility]     Jackie Monte Patient Status:  Inpatient    2000 MRN [id number]   Location [facility] 5NW-A Attending Les Parks DO   Hosp Day # 1 PCP None Pcp     Date of Admission: 2024  Date of Discharge:   2024    Discharge Disposition: Home or Self Care    Discharge Diagnosis:  #Abdominal pain, possible early acute cholecystitis  #Transaminitis  #Ulcerative colitis  #Primary sclerosing cholangitis  #Anxiety  #History of Cdiff  #Hypokalemia    History of Present Illness:   Jackie Monte is a 23 year old female with ulcerative colitis, primary sclerosing cholangitis and anxiety who presents with abdominal pain. Patient was well until this weekend when she began to have upper abdominal pain radiating to her back. Associated was nausea and vomiting. No diarrhea. No fever. Patient groggy after analgesics and history a bit limited. Pain is different than past episodes. She continued on Vanco.    Brief Synopsis:  Patient was started on IV antibiotics. She was made NPO and given IVF. Surgery and GI were consulted. Patient underwent MRCP which showed acute cholecystitis, choledocholithiasis and intrahepatic duct stricturing of left lobe of liver indicating mild primary sclerosing cholangitis. GI recommended transfer to tertiary center given history of established care and Surgery agreed. GI and Surgery spoke with patient's hepatologist at [facility] who accepted patient for transfer to [facility]. Patient was transferred to [place].     Lace+ Score: 52  59-90 High Risk  29-58 Medium Risk  0-28   Low Risk  Patient was referred to [facility].    TCM Follow-Up Recommendation:  LACE 29-58: Moderate Risk of readmission after discharge from the hospital.      Procedures during hospitalization:   MRCP  US Abd    PROCEDURE:  MRI ABDOMEN&MRCP W/3D (CPT=74181/42708)     COMPARISON:  [facility], US ABDOMEN COMPLETE (CPT=76700), 2/06/2024, 12:39 PM.  [facility], MR, MRI ABDOMEN&MRCP W/3D (W+W0)(CPT=74183/00001), 7/05/2023, 7:54 AM.     INDICATIONS:  Abdominal pain /vomiting; hx of PSC     TECHNIQUE:    Multiplanar single shot fast spin echo, chemical shift imaging, breath hold T2 weighted images and 2-D fiesta sequences were acquired. No contrast material was administered. Fluid sensitive imaging with MRCP. Reconstruction was also  performed including 3D multi-projection imaging.  Both projection and source MRCP images are evaluated.     3-D RENDERING:  Additional 3-D rendering was generated by the technologist.     PATIENT STATED HISTORY:(As transcribed by Technologist)  galbllader pain, hx PSC (primary sclerosing cholangitis)         FINDINGS:    LIVER:  No enlargement, atrophy, abnormal density, or significant focal lesion.  There is a simple cyst within the lateral segment left lobe of the liver measuring 13 x 10 mm.  BILIARY:  The gallbladder is greatly distended with thickened gallbladder wall measuring up to 10 mm in  thickness.  The common bile duct is mildly dilated at 8 mm. There is a filling defect within the mid common bile duct measuring 2-3 mm in size  consistent with choledocholithiasis.  Unfortunately, there is significant motion artifact which makes the MRCP portion difficult to fully evaluate for sclerosing cholangitis.  Should be noted that in the left lobe of the liver there are areas of  narrowing of the bile ducts within the lateral segment with more distal enlargement of the bile duct which would suggest mild stricturing and can be seen with mild sclerosing cholangitis.  PANCREAS:  No lesion, fluid collection, ductal dilatation, or atrophy.    SPLEEN:  No enlargement or focal lesion.    KIDNEYS:  No mass or obstruction.    ADRENALS:  No mass or enlargement.    AORTA/VASCULAR:  No aneurysm or dissection.    RETROPERITONEUM:  No mass or adenopathy.    BOWEL/MESENTERY:  No visible mass, obstruction, or bowel wall thickening.    ABDOMINAL WALL:  No mass or hernia.    PELVIC NODES:  Normal.  PELVIC ORGANS:  Normal for age.  BONES:  No bony lesion or fracture.    LUNG BASES:  No visible pleural disease.  Lung bases not well assessed with MRI.    OTHER:  Negative.                     Impression   CONCLUSION:       1.  Extensive distension of the gallbladder with wall thickening which can be seen with acute cholecystitis.     2.  Choledocholithiasis with a small stone within the mid duct.       3. Mild stricturing noted within the intrahepatic ducts of the left lobe of the liver which can be seen with mild primary sclerosing cholangitis.             PROCEDURE:  US ABDOMEN COMPLETE (CPT=76700)     COMPARISON:  [address] [facility], MR, MRI ABDOMEN&MRCP W/3D (W+W0)(CPT=74183/14363), 7/05/2023, 7:54 AM.     INDICATIONS:  hx PSC, epigastric and ruq pain, last stent 2020     TECHNIQUE:  Real time gray-scale ultrasound was used to evaluate the abdomen.  The exam includes images of the liver, gallbladder, common bile duct, pancreas,  spleen, kidneys, IVC, and aorta.     FINDINGS:    LIVER:  Normal size and echogenicity. No significant masses.  BILIARY:  The gallbladder is distended and contains sludge and stones.  There is gallbladder wall thickening measuring up to 5 mm in thickness.  Possibility of early acute or chronic cholecystitis are not entirely excluded.  Clinical correlation  recommended.  HIDA scan may be of further value.  The common bile duct is at the upper limits of normal measuring up to 7 mm in diameter with choledocholithiasis, stricture, or distal obstructing mass not entirely excluded.  Clinical correlation  recommended.  MRCP/ERCP may be of further value.  PANCREAS:  Tail obscured by bowel gas limiting evaluation, otherwise the visualized portions are unremarkable.  SPLEEN:  Unremarkable measuring up to 9.7 cm.  KIDNEYS:  No hydronephrosis.  Right kidney measures 10.1 cm.  Left kidney measures 10.8 cm.  AORTA/IVC:  Visualized portions are unremarkable.                   Impression   CONCLUSION:       1. The gallbladder is distended and contains sludge and stones.  There is gallbladder wall thickening measuring up to 5 mm in thickness.  Possibility of early acute or chronic cholecystitis are not entirely excluded.  Clinical correlation recommended.    HIDA scan may be of further value.       2. The common bile duct is at the upper limits of normal measuring up to 7 mm in diameter with choledocholithiasis, stricture, or distal obstructing mass not entirely excluded.  Clinical correlation recommended.  MRCP/ERCP may be of further value.     3. Limited evaluation of the pancreas due to partial obscuration by bowel gas.       Incidental or significant findings and recommendations (brief descriptions):  See brief synopsis above     Lab/Test results pending at Discharge:   None    Consultants:  GI  Surgery    Discharge Medication List:     Discharge Medications        CONTINUE taking these medications        Instructions  Prescription details   adalimumab 80 MG/0.8ML Pnkt  Commonly known as: Humira Pen      Inject 160 mg into the skin once.   Refills: 0     albuterol 108 (90 Base) MCG/ACT Aers  Commonly known as: Ventolin HFA      Inhale 2 puffs into the lungs every 6 (six) hours as needed.   Refills: 0     Budesonide 9 MG Tb24      Take 9 mg by mouth daily.   Refills: 0     butalbital-aspirin-caffeine -40 MG Caps  Commonly known as: FIORINAL      Take 1 capsule by mouth every 4 (four) hours as needed for Pain.   Refills: 0     clonazePAM 0.5 MG Tabs  Commonly known as: KlonoPIN      Take 1 tablet (0.5 mg total) by mouth as needed.   Refills: 0     diphenhydrAMINE 12.5 MG/5ML Elix  Commonly known as: Benadryl      Take  by mouth 4 (four) times daily as needed for Allergies.   Refills: 0     ondansetron 4 MG Tbdp  Commonly known as: Zofran-ODT      Take 1 tablet (4 mg total) by mouth every 8 (eight) hours as needed for Nausea.   Refills: 0     Rizatriptan Benzoate 10 MG Tabs  Commonly known as: MAXALT      Take 1 tablet (10 mg total) by mouth as needed for Migraine.   Refills: 0     sertraline 100 MG Tabs  Commonly known as: Zoloft      Take 1 tablet (100 mg total) by mouth daily.   Refills: 0     vancomycin 125 MG Caps  Commonly known as: Vancocin      Take 1 capsule (125 mg total) by mouth 4 (four) times daily.   Refills: 0            STOP taking these medications      budesonide 0.5 MG/2ML Susp  Commonly known as: Pulmicort        ergocalciferol 1.25 MG (59707 UT) Caps  Commonly known as: Vitamin D2        potassium chloride 20 MEQ Tbcr  Commonly known as: K-Dur        vedolizumab 300 MG Solr  Commonly known as: Entyvio               ASK your doctor about these medications        Instructions Prescription details   desloratadine 5 MG Tabs  Commonly known as: Clarinex      Take 5 mg by mouth daily.   Refills: 0              [place]    Follow-up appointment:   Pcp, None  [place] [address]    Follow  up      Cece Treviño DO  [address]  [place] [address]  [phone]    Follow up      Aris Wells MD  [address]  [place] [address]  [phone]    Follow up      Appointments for Next 30 Days 2024 - 3/8/2024      None            Vital signs:  Temp:  [98.3 °F (36.8 °C)-98.8 °F (37.1 °C)] 98.7 °F (37.1 °C)  Pulse:  [88-92] 92  Resp:  [16-18] 18  BP: (110-122)/(70-86) 119/81  SpO2:  [98 %-100 %] 98 %    Physical Exam:    General: No acute distress   Lungs: clear to auscultation  Cardiovascular: S1, S2  Abdomen: Soft      -----------------------------------------------------------------------------------------------  PATIENT DISCHARGE INSTRUCTIONS: See electronic chart    Les Parks DO    Total time spent on discharge plannin minutes     The  [employer] C[employer] Act makes medical notes like these available to patients in the interest of transparency. Please be advised this is a medical document. Medical documents are intended to carry relevant information, facts as evident, and the clinical opinion of the practitioner. The medical note is intended as peer to peer communication and may appear blunt or direct. It is written in medical language and may contain abbreviations or verbiage that are unfamiliar.       Electronically signed by Les Parks DO on 2024  3:27 PM           Physical Therapy Notes (last 72 hours)  Notes from 2024  5:39 PM through 2024  5:39 PM   No notes of this type exist for this encounter.     Occupational Therapy Notes (last 72 hours)  Notes from 2024  5:39 PM through 2024  5:39 PM   No notes of this type exist for this encounter.     Video Swallow Study Notes    No notes of this type exist for this encounter.     SLP Notes    No notes of this type exist for this encounter.     Immunizations     Name Date      Covid-19 Pfizer 21       Future Appointments        Provider Department Center    3/18/2024 8:00 AM JAIME MR RM1 (1.5T)  [facility] - [address]      Multidisciplinary Problems     Active Goals        Problem: Patient/Family Goals    Goal Priority Disciplines Outcome Interventions   Patient/Family Long Term Goal     Interdisciplinary Progressing    Description: Patient's Long Term Goal:   Discharge to home     Interventions:  - Follow plan of care  - See additional Care Plan goals for specific interventions   Patient/Family Short Term Goal     Interdisciplinary Progressing    Description: Patient's Short Term Goal:   2/6 noc: reduce pain    Interventions:   - Medication  - GI  - Gen surgery    - See additional Care Plan goals for specific interventions

## (undated) NOTE — LETTER
April 25, 2025    Patient: Jackie Monte   Date of Visit: 4/25/2025       To Whom It May Concern:    Jackie Monte was seen and treated in our emergency department on 4/25/2025. She should not return to work until 4/29/25 .    If you have any questions or concerns, please don't hesitate to call.       Encounter Provider(s):    Winston Amador MD

## (undated) NOTE — ED AVS SNAPSHOT
[facility] Emergency Department    [place]  [address] [place] [address]    Phone:  [phone]    Fax:  [phone] [facility]   MRN: [id number]    Department:  [facility] Emergency Department   Date of Visit:  1/21/ IF THERE IS ANY CHANGE OR WORSENING OF YOUR CONDITION, CALL YOUR PRIMARY CARE PHYSICIAN AT ONCE OR RETURN IMMEDIATELY TO THE EMERGENCY DEPARTMENT.     If you have been prescribed any medication(s), please fill your prescription right away and begin taking t

## (undated) NOTE — ED AVS SNAPSHOT
Andrea Angel   MRN: [id number]    Department:  [facility] Emergency Department in [place]   Date of Visit:  2/22/2019           Disclosure     Insurance plans vary and the physician(s) referred by the ER may not be covered by your plan.  Please contact tell this physician (or your personal doctor if your instructions are to return to your personal doctor) about any new or lasting problems. The primary care or specialist physician will see patients referred from the [facility] Emergency Department.  Marvin Hills

## (undated) NOTE — LETTER
Cookie Diaz MD        I acknowledge that I have been informed of the risk involved by refusing the urine pregnancy test  on [address].     I, thereby, re

## (undated) NOTE — ED AVS SNAPSHOT
Sanford Lizama   MRN: [id number]    Department:  [facility] Emergency Department in [place]   Date of Visit:  9/13/2019           Disclosure     Insurance plans vary and the physician(s) referred by the ER may not be covered by your plan.  Please contact tell this physician (or your personal doctor if your instructions are to return to your personal doctor) about any new or lasting problems. The primary care or specialist physician will see patients referred from the [facility] Emergency Department.  Raj Molina

## (undated) NOTE — ED AVS SNAPSHOT
[facility] Emergency Department    [place]  [address] [address]    Phone:  [phone]    Fax:  [phone]   MRN: [id number]    Department:  [facility] Emergency Department   Date of Visit:  6/15/ Norco Norco 1-2 tablets every 4-6 hours as needed for pain. If Norco not helping her abdominal pain please call Dr. Sammie Lindsey for further instructions. Follow-up with Dr. Sammie Lindsey as scheduled in 2 weeks.     Discharge References/Attachments     ULCERATIVE COL IF THERE IS ANY CHANGE OR WORSENING OF YOUR CONDITION, CALL YOUR PRIMARY CARE PHYSICIAN AT ONCE OR RETURN IMMEDIATELY TO THE EMERGENCY DEPARTMENT.     If you have been prescribed any medication(s), please fill your prescription right away and begin taking t Patient [address] to help you get signed up for insurance coverage. Patient [address] is a Federal Navigator program that can help with your Affordable Care Act coverage, as well as all types of Medicaid plans.   To get signed up and covere Support Staff. Remember, MyChart is NOT to be used for urgent needs. For medical emergencies, dial 911.

## (undated) NOTE — IP AVS SNAPSHOT
Patient Demographics     Address  [id number] [place] [address] RA[address] [address] Phone  [phone] [facility])  [phone] (Mobile) *Preferred* E-mail Address  [email or web address]      Emergency Contact(s)     Name Relation Home Work Mobile    Kenyetta Munoz Generic drug:  Norethin-Eth Estrad-Fe Biphas      Take 1 tablet by mouth daily. [    ]   [    ]   [    ]   [    ]     Cristina Matta 9 MG Tb24  Generic drug:  Budesonide      Take by mouth.     [    ]   [    ]   [    ]   [    ]              Lory Kansas The following orders were created for panel order CBC WITH DIFFERENTIAL WITH PLATELET.   Procedure                               Abnormality         Status                     ---------                               -----------         ------ Final result   Ordering provider:  Taiwo Duke MD  09/02/17 0271 Resulting lab:  [facility]    Specimen Information    Type Source Collected On   Blood — 09/03/17 1097          Components    Component Value Reference Range Flag Lab   Magnesium 2.2 1.7 Past Surgical History:[FA.1] Past Surgical History:  8/14/2013: COLONOSCOPY      Comment: Procedure: COLONOSCOPY;  Surgeon: Alice Downing MD;  Location: [address] [place] [address]  6/15/2017: COLONOSCOPY N/A      Comment: Procedure: COLONOSCOPY;  Prudencio Holder A comprehensive 14 point review of systems was completed. Pertinent positives and negatives noted in the HPI.     Physical Exam:[FA.1]    /85   Pulse 67   Temp (!) 97.2 °F (36.2 °C) (Temporal)   Resp 16   Ht 170.2 cm (5' 7\")   Wt 130 lb (59 kg) Plan of care discussed with the ER physician    Liliane Martinez MD  9/2/2017[FA.1]                Electronically signed by Jesu Gaines MD on 9/2/2017  5:31 PM   Attribution Key    FA. 1 - eJsu Gaines MD on 9/2/2017  3:17 PM  FA. 2 - Jesu Gaines MD Comment: Procedure: COLONOSCOPY;  Surgeon: Gato Mathews MD;  Location: [facility] ENDOSCOPY  No date: ERCP,DIAGNOSTIC[FA.2]  Social History:[FA.1]  reports that she has never smoked.  She has never used smokeless tobacco. She reports that she Ht 170.2 cm (5' 7\")   Wt 130 lb (59 kg)   LMP 08/28/2017   SpO2 100%   BMI 20.36 kg/m²[FA. 2]   General: No acute distress. Alert and oriented x 3. HEENT: Normocephalic atraumatic. Moist mucous membranes. EOM-I. PERRLA. Anicteric.   Neck: No lymphadenopat FA.2 Alaina Vazquez MD on 9/2/2017  3:18 PM  FA. 3 - Adam Kulkarni MD on 9/2/2017  5:29 PM                        Consults - MD Consult Notes      Consults signed by Dawit Watts MD at 9/3/2017 11:56 AM     Author:  Dawit Watts MD Service:  Silvina Mckeon patient's known diagnosis of ulcerative colitis. HIDA scan was performed and there was no evidence of uptake at 90 minutes.   This was considered an incomplete exam.  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 The patient has  known cholelithiasis since December 2015. CT scan findings and HIDA scan are consistent with cholelithiasis, cholecystitis.   Total bilirubin has increased from 1.5-4.5, possible choledocholi Electronically signed by Yazmin Alvarez MD on 9/3/2017 11:56 AM   Attribution Key    BK. 1 - Yazmin Alvarez MD on 9/3/2017 11:42 AM                     D/C Summary     No notes of this type exist for this encounter.       Physical Therapy Notes (last 72

## (undated) NOTE — ED AVS SNAPSHOT
Yasmin Odell   MRN: [id number]    Department:  [facility] Emergency Department   Date of Visit:  2/20/2020           Disclosure     Insurance plans vary and the physician(s) referred by the ER may not be covered by your plan.  Please contact your tell this physician (or your personal doctor if your instructions are to return to your personal doctor) about any new or lasting problems. The primary care or specialist physician will see patients referred from the [facility] Emergency Department.  Marvin Hills

## (undated) NOTE — IP AVS SNAPSHOT
[facility] [place] [address] [facility], [address] [place] [address] Rd ~ [phone]                Discharge Summary   3/2/2017    Ry Hammer           Admission Information        Provider Department    3/2/2017 Leonor Olivier MD  2ne-A         Than Notes to Patient:  Resume as previously taken at home. Take 5 mg by mouth daily. DiphenhydrAMINE HCl 12.5 MG/5ML Elix   Commonly known as:  BENADRYL        Take  by mouth 4 (four) times daily as needed for Allergies. Diseases, CARDIOLOGY, Cardiac Electrophysiology    Contact information:    [address] [address] [phone]          Follow up with Vallarie Koyanagi, MD In 1 week.     Specialty:  PEDIATRICS    Why:  foll 86.4 (03/03/17)  12.4 (03/03/17)  0.9 (03/03/17)  0.0 (03/03/17)  0.0 -- (03/03/17)  5.55 (03/03/17)  0.80 (L) (03/03/17)  0.06 (L) (03/03/17)  0.00 (03/03/17)  0.00    (03/02/17)  64.3 (03/02/17)  27.1 (03/02/17)  6.1 (03/02/17)  1.9 (03/02/17)  0.3  (03/ [employer] will allow you to access patient instructions from your recent visit,  view other health information, and more. To sign up or find more information, go to [email or web address] and click on the Sign Up Now link in the [employer] box.      Enter What to report to your healthcare team: Pain, nausea/vomiting, no bowel movement in 2+ days, diarrhea           Respiratory Medications     budesonide 0.5 MG/2ML Inhalation Suspension    Albuterol Sulfate HFA (VENTOLIN) 108 (90 BASE) MCG/ACT Inhalation Aer

## (undated) NOTE — ED AVS SNAPSHOT
Robert Wilkins   MRN: [id number]    Department:  [facility] Emergency Department   Date of Visit:  5/31/2018           Disclosure     Insurance plans vary and the physician(s) referred by the ER may not be covered by your plan.  Please contact your tell this physician (or your personal doctor if your instructions are to return to your personal doctor) about any new or lasting problems. The primary care or specialist physician will see patients referred from the [facility] Emergency Department.  Todd May

## (undated) NOTE — LETTER
ASTHMA ACTION PLAN for Jackie Monte     : 2000     Date: 2024  Provider:  Jermaine Mayes MD  Phone for doctor or clinic: [facility], [address] [place] [id number] [phone]           You can use the colors of a traffic light to help learn about your asthma medicines.      1. Green - Go! % of Personal Best Peak Flow Use controller medicine.   Breathing is good  No cough or wheeze  Can work and play Medicine How much to take When to take it    Trigger avoidance, pretreatment with albuterol prior to vigorous exercise.       2. Yellow - Caution. 50-79% Personal Best Peak  Flow.  Use reliever medicine to keep an asthma attack from getting bad.   Cough  Wheezing  Tight Chest  Wake up at night Medicine How much to take When to take it    Albuterol inhaler, 2 puffs every 4 hours as needed.          Additional instructions Call PCP        3. Red - Stop! Danger!  <50% Personal Best Peak  Flow. Take these medications until  Get help from a doctor   Medicine not helping  Breathing is hard and fast  Nose opens wide  Can't walk  Ribs show  Can't talk well Medicine How much to take When to take it    Call PCP, proceed to ER     Additional Instructions If your symptoms do not improve and you cannot contact your doctor, [facility] or call 911 immediately!     [x] Asthma Action Plan reviewed with patient (and caregiver if necessary) and a copy of the plan was given to the patient/caregiver.   [] Asthma Action Plan reviewed with patient (and caregiver if necessary) on the phone and mailed copy to patient or submitted via [employer].     Signatures:  Provider  Jermaine Mayes MD   Patient Caretaker

## (undated) NOTE — ED AVS SNAPSHOT
[facility] Emergency Department    [place]  [address] [place] [address]    Phone:  [phone]    Fax:  [phone] [facility]   MRN: [id number]    Department:  [facility] Emergency Department   Date of Visit:  6/15/ IF THERE IS ANY CHANGE OR WORSENING OF YOUR CONDITION, CALL YOUR PRIMARY CARE PHYSICIAN AT ONCE OR RETURN IMMEDIATELY TO THE EMERGENCY DEPARTMENT.     If you have been prescribed any medication(s), please fill your prescription right away and begin taking t

## (undated) NOTE — ED AVS SNAPSHOT
Stephanie Wilkerson   MRN: [id number]    Department:  [facility] Emergency Department   Date of Visit:  11/3/2018           Disclosure     Insurance plans vary and the physician(s) referred by the ER may not be covered by your plan.  Please contact your tell this physician (or your personal doctor if your instructions are to return to your personal doctor) about any new or lasting problems. The primary care or specialist physician will see patients referred from the [facility] Emergency Department.  Nicolas Joe

## (undated) NOTE — ED AVS SNAPSHOT
Mary Arceo   MRN: [id number]    Department:  [facility] Emergency Department   Date of Visit:  3/17/2019           Disclosure     Insurance plans vary and the physician(s) referred by the ER may not be covered by your plan.  Please contact your tell this physician (or your personal doctor if your instructions are to return to your personal doctor) about any new or lasting problems. The primary care or specialist physician will see patients referred from the [facility] Emergency Department.  Diandra Herman

## (undated) NOTE — ED AVS SNAPSHOT
[facility] Emergency Department    [place]  [address] [address]    Phone:  [phone]    Fax:  [phone] [facility]   MRN: [id number]    Department:  [facility] Emergency Department   Date of Visit:  1/21/ 10 mL                     Medication Information       Follow the directions for taking your medications provided by your doctor.  Please ask your health care provider, pharmacist or nurse if you have any questions regarding your home medications, including a substitute for ongoing medical care. Often, one Emergency Department visit does not uncover every injury or illness.  If you have been referred to a primary care or a specialist physician for a follow-up visit, please tell this physician (or your personal [facility] [place] ([address]) [phone] [address]) [phone]                Additional Information       We are concerned for your overall well being:    - If you are a smoker o Enter your Tailored Activation Code exactly as it appears below along with your Zip Code and Date of Birth to complete the sign-up process. If you do not sign up before the expiration date, you must request a new code.     Your unique Tailored Access Code: [email or web address]